# Patient Record
Sex: MALE | Race: WHITE | NOT HISPANIC OR LATINO | Employment: OTHER | URBAN - METROPOLITAN AREA
[De-identification: names, ages, dates, MRNs, and addresses within clinical notes are randomized per-mention and may not be internally consistent; named-entity substitution may affect disease eponyms.]

---

## 2023-09-27 ENCOUNTER — APPOINTMENT (OUTPATIENT)
Dept: MRI IMAGING | Facility: CLINIC | Age: 73
DRG: 073 | End: 2023-09-27
Attending: EMERGENCY MEDICINE
Payer: COMMERCIAL

## 2023-09-27 ENCOUNTER — APPOINTMENT (OUTPATIENT)
Dept: CT IMAGING | Facility: CLINIC | Age: 73
DRG: 073 | End: 2023-09-27
Attending: EMERGENCY MEDICINE
Payer: COMMERCIAL

## 2023-09-27 ENCOUNTER — HOSPITAL ENCOUNTER (INPATIENT)
Facility: CLINIC | Age: 73
LOS: 2 days | Discharge: HOME | DRG: 073 | End: 2023-09-29
Attending: EMERGENCY MEDICINE | Admitting: STUDENT IN AN ORGANIZED HEALTH CARE EDUCATION/TRAINING PROGRAM
Payer: COMMERCIAL

## 2023-09-27 DIAGNOSIS — R29.898 RIGHT ARM WEAKNESS: ICD-10-CM

## 2023-09-27 LAB
ALBUMIN UR-MCNC: NEGATIVE MG/DL
ANION GAP SERPL CALCULATED.3IONS-SCNC: 14 MMOL/L (ref 7–15)
APPEARANCE UR: CLEAR
APTT PPP: 29 SECONDS (ref 22–38)
ATRIAL RATE - MUSE: 74 BPM
BASOPHILS # BLD AUTO: 0.1 10E3/UL (ref 0–0.2)
BASOPHILS NFR BLD AUTO: 1 %
BILIRUB UR QL STRIP: NEGATIVE
BUN SERPL-MCNC: 5.6 MG/DL (ref 8–23)
CALCIUM SERPL-MCNC: 8.7 MG/DL (ref 8.8–10.2)
CHLORIDE SERPL-SCNC: 94 MMOL/L (ref 98–107)
COLOR UR AUTO: NORMAL
CREAT SERPL-MCNC: 0.7 MG/DL (ref 0.67–1.17)
DEPRECATED HCO3 PLAS-SCNC: 24 MMOL/L (ref 22–29)
DIASTOLIC BLOOD PRESSURE - MUSE: NORMAL MMHG
EGFRCR SERPLBLD CKD-EPI 2021: >90 ML/MIN/1.73M2
EOSINOPHIL # BLD AUTO: 0.2 10E3/UL (ref 0–0.7)
EOSINOPHIL NFR BLD AUTO: 3 %
ERYTHROCYTE [DISTWIDTH] IN BLOOD BY AUTOMATED COUNT: 11.7 % (ref 10–15)
ETHANOL SERPL-MCNC: 0.21 G/DL
GLUCOSE SERPL-MCNC: 80 MG/DL (ref 70–99)
GLUCOSE UR STRIP-MCNC: NEGATIVE MG/DL
HBA1C MFR BLD: 4.6 %
HCT VFR BLD AUTO: 36.5 % (ref 40–53)
HGB BLD-MCNC: 13 G/DL (ref 13.3–17.7)
HGB UR QL STRIP: NEGATIVE
IMM GRANULOCYTES # BLD: 0 10E3/UL
IMM GRANULOCYTES NFR BLD: 0 %
INR PPP: 0.87 (ref 0.85–1.15)
INTERPRETATION ECG - MUSE: NORMAL
KETONES UR STRIP-MCNC: NEGATIVE MG/DL
LEUKOCYTE ESTERASE UR QL STRIP: NEGATIVE
LYMPHOCYTES # BLD AUTO: 1.5 10E3/UL (ref 0.8–5.3)
LYMPHOCYTES NFR BLD AUTO: 27 %
MCH RBC QN AUTO: 36.1 PG (ref 26.5–33)
MCHC RBC AUTO-ENTMCNC: 35.6 G/DL (ref 31.5–36.5)
MCV RBC AUTO: 101 FL (ref 78–100)
MONOCYTES # BLD AUTO: 0.6 10E3/UL (ref 0–1.3)
MONOCYTES NFR BLD AUTO: 10 %
NEUTROPHILS # BLD AUTO: 3.4 10E3/UL (ref 1.6–8.3)
NEUTROPHILS NFR BLD AUTO: 59 %
NITRATE UR QL: NEGATIVE
NRBC # BLD AUTO: 0 10E3/UL
NRBC BLD AUTO-RTO: 0 /100
P AXIS - MUSE: 46 DEGREES
PH UR STRIP: 6.5 [PH] (ref 5–7)
PLATELET # BLD AUTO: 226 10E3/UL (ref 150–450)
POTASSIUM SERPL-SCNC: 4.4 MMOL/L (ref 3.4–5.3)
PR INTERVAL - MUSE: 184 MS
QRS DURATION - MUSE: 102 MS
QT - MUSE: 412 MS
QTC - MUSE: 457 MS
R AXIS - MUSE: -28 DEGREES
RBC # BLD AUTO: 3.6 10E6/UL (ref 4.4–5.9)
RBC URINE: <1 /HPF
SODIUM SERPL-SCNC: 132 MMOL/L (ref 135–145)
SP GR UR STRIP: 1.01 (ref 1–1.03)
SYSTOLIC BLOOD PRESSURE - MUSE: NORMAL MMHG
T AXIS - MUSE: 61 DEGREES
TROPONIN T SERPL HS-MCNC: 14 NG/L
UROBILINOGEN UR STRIP-MCNC: NORMAL MG/DL
VENTRICULAR RATE- MUSE: 74 BPM
WBC # BLD AUTO: 5.8 10E3/UL (ref 4–11)
WBC URINE: 1 /HPF

## 2023-09-27 PROCEDURE — 120N000001 HC R&B MED SURG/OB

## 2023-09-27 PROCEDURE — A9585 GADOBUTROL INJECTION: HCPCS | Performed by: EMERGENCY MEDICINE

## 2023-09-27 PROCEDURE — 250N000011 HC RX IP 250 OP 636

## 2023-09-27 PROCEDURE — 99223 1ST HOSP IP/OBS HIGH 75: CPT | Mod: AI | Performed by: STUDENT IN AN ORGANIZED HEALTH CARE EDUCATION/TRAINING PROGRAM

## 2023-09-27 PROCEDURE — 255N000002 HC RX 255 OP 636: Performed by: EMERGENCY MEDICINE

## 2023-09-27 PROCEDURE — 82077 ASSAY SPEC XCP UR&BREATH IA: CPT | Performed by: EMERGENCY MEDICINE

## 2023-09-27 PROCEDURE — 82310 ASSAY OF CALCIUM: CPT | Performed by: EMERGENCY MEDICINE

## 2023-09-27 PROCEDURE — 80048 BASIC METABOLIC PNL TOTAL CA: CPT | Performed by: EMERGENCY MEDICINE

## 2023-09-27 PROCEDURE — 0042T CT HEAD PERFUSION W CONTRAST: CPT

## 2023-09-27 PROCEDURE — 80061 LIPID PANEL: CPT | Performed by: STUDENT IN AN ORGANIZED HEALTH CARE EDUCATION/TRAINING PROGRAM

## 2023-09-27 PROCEDURE — 72156 MRI NECK SPINE W/O & W/DYE: CPT

## 2023-09-27 PROCEDURE — 93005 ELECTROCARDIOGRAM TRACING: CPT

## 2023-09-27 PROCEDURE — 250N000011 HC RX IP 250 OP 636: Performed by: EMERGENCY MEDICINE

## 2023-09-27 PROCEDURE — 70553 MRI BRAIN STEM W/O & W/DYE: CPT

## 2023-09-27 PROCEDURE — 84484 ASSAY OF TROPONIN QUANT: CPT | Performed by: EMERGENCY MEDICINE

## 2023-09-27 PROCEDURE — 81001 URINALYSIS AUTO W/SCOPE: CPT | Performed by: EMERGENCY MEDICINE

## 2023-09-27 PROCEDURE — 258N000003 HC RX IP 258 OP 636: Performed by: EMERGENCY MEDICINE

## 2023-09-27 PROCEDURE — 85025 COMPLETE CBC W/AUTO DIFF WBC: CPT | Performed by: EMERGENCY MEDICINE

## 2023-09-27 PROCEDURE — 99291 CRITICAL CARE FIRST HOUR: CPT | Mod: 25

## 2023-09-27 PROCEDURE — 36415 COLL VENOUS BLD VENIPUNCTURE: CPT | Performed by: EMERGENCY MEDICINE

## 2023-09-27 PROCEDURE — 83036 HEMOGLOBIN GLYCOSYLATED A1C: CPT | Performed by: STUDENT IN AN ORGANIZED HEALTH CARE EDUCATION/TRAINING PROGRAM

## 2023-09-27 PROCEDURE — 85610 PROTHROMBIN TIME: CPT | Performed by: EMERGENCY MEDICINE

## 2023-09-27 PROCEDURE — 82607 VITAMIN B-12: CPT | Performed by: STUDENT IN AN ORGANIZED HEALTH CARE EDUCATION/TRAINING PROGRAM

## 2023-09-27 PROCEDURE — 70496 CT ANGIOGRAPHY HEAD: CPT

## 2023-09-27 PROCEDURE — 85730 THROMBOPLASTIN TIME PARTIAL: CPT | Performed by: EMERGENCY MEDICINE

## 2023-09-27 PROCEDURE — 250N000009 HC RX 250: Performed by: EMERGENCY MEDICINE

## 2023-09-27 PROCEDURE — 70450 CT HEAD/BRAIN W/O DYE: CPT

## 2023-09-27 RX ORDER — DIPHENHYDRAMINE HYDROCHLORIDE 50 MG/ML
INJECTION INTRAMUSCULAR; INTRAVENOUS
Status: COMPLETED
Start: 2023-09-27 | End: 2023-09-27

## 2023-09-27 RX ORDER — NICOTINE 21 MG/24HR
1 PATCH, TRANSDERMAL 24 HOURS TRANSDERMAL DAILY
Status: ON HOLD | COMMUNITY
End: 2023-09-28

## 2023-09-27 RX ORDER — ALBUTEROL SULFATE 90 UG/1
2 AEROSOL, METERED RESPIRATORY (INHALATION) 4 TIMES DAILY PRN
COMMUNITY

## 2023-09-27 RX ORDER — SERTRALINE HYDROCHLORIDE 100 MG/1
150 TABLET, FILM COATED ORAL DAILY
COMMUNITY

## 2023-09-27 RX ORDER — IPRATROPIUM BROMIDE AND ALBUTEROL SULFATE 2.5; .5 MG/3ML; MG/3ML
3 SOLUTION RESPIRATORY (INHALATION) EVERY 4 HOURS PRN
Status: DISCONTINUED | OUTPATIENT
Start: 2023-09-27 | End: 2023-09-29 | Stop reason: HOSPADM

## 2023-09-27 RX ORDER — LISINOPRIL 10 MG/1
10 TABLET ORAL DAILY
COMMUNITY

## 2023-09-27 RX ORDER — GADOBUTROL 604.72 MG/ML
7 INJECTION INTRAVENOUS ONCE
Status: COMPLETED | OUTPATIENT
Start: 2023-09-27 | End: 2023-09-27

## 2023-09-27 RX ORDER — ASPIRIN 81 MG/1
81 TABLET ORAL DAILY
Status: DISCONTINUED | OUTPATIENT
Start: 2023-09-28 | End: 2023-09-29 | Stop reason: HOSPADM

## 2023-09-27 RX ORDER — IOPAMIDOL 755 MG/ML
125 INJECTION, SOLUTION INTRAVASCULAR ONCE
Status: COMPLETED | OUTPATIENT
Start: 2023-09-27 | End: 2023-09-27

## 2023-09-27 RX ORDER — AMLODIPINE BESYLATE 10 MG/1
10 TABLET ORAL DAILY
COMMUNITY

## 2023-09-27 RX ORDER — ASPIRIN 81 MG/1
81 TABLET, CHEWABLE ORAL DAILY
Status: ON HOLD | COMMUNITY
End: 2023-09-28

## 2023-09-27 RX ADMIN — DIPHENHYDRAMINE HYDROCHLORIDE 50 MG: 50 INJECTION, SOLUTION INTRAMUSCULAR; INTRAVENOUS at 18:03

## 2023-09-27 RX ADMIN — GADOBUTROL 7 ML: 604.72 INJECTION INTRAVENOUS at 22:51

## 2023-09-27 RX ADMIN — SODIUM CHLORIDE 100 ML: 9 INJECTION, SOLUTION INTRAVENOUS at 18:05

## 2023-09-27 RX ADMIN — IOPAMIDOL 125 ML: 755 INJECTION, SOLUTION INTRAVENOUS at 18:05

## 2023-09-27 RX ADMIN — SODIUM CHLORIDE 1000 ML: 9 INJECTION, SOLUTION INTRAVENOUS at 18:49

## 2023-09-27 ASSESSMENT — ACTIVITIES OF DAILY LIVING (ADL)
ADLS_ACUITY_SCORE: 35

## 2023-09-27 NOTE — ED NOTES
Bed: ST03  Expected date:   Expected time:   Means of arrival:   Comments:  Shannan 541 73 M stroke alert left arm weakness alert eta [id number]

## 2023-09-27 NOTE — CONSULTS
[facility]    Stroke Telephone Note    I was called by Radha Cobb on 09/27/23 regarding patient Duane M Franek. 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 He went to take a nap around 12 noon and woke up at 1:30 PM with worsening RUE weakness. No other focal deficits on exam.     BP (!) 144/79   Pulse 67   Temp 98  F (36.7  C)   Resp 16   Wt 72.9 kg (160 lb 11.5 oz)   SpO2 96%   BMI 24.80 kg/m       Stroke Code Data (for stroke code without tele)  Stroke code activated 09/27/23   1755   Stroke provider first response  09/27/23   1759            Last known normal 09/27/23   1200        Time of discovery   (or onset of symptoms) 09/27/23   1330   Head CT read by Stroke Neuro Dr/Provider 09/27/23   1820   Was stroke code de-escalated? Yes 09/27/23 1829          Imaging Findings  CT head: Right corona radiata hypodensity, age indeterminate  CTA head/neck: No occlusion, dissection or hemodynamically significant stenosis    Intravenous Thrombolysis  Not given due to:   - unclear or unfavorable risk-benefit profile for extended window thrombolysis beyond the conventional 4.5 hour time window    Endovascular Treatment  Not initiated due to absence of proximal vessel occlusion    Impression  Acute on chronic RUE weakness, unclear etiology  Age indeterminate right corona radiata hypodensity    Recommendations   Recommend MRI Brain and MRI Cervical spine wwo contrast  If acute stroke noted on MRI, please call us back with further recommendations. Will need gen neuro consult otherwise    My recommendations are based on the information provided over the phone by Duane M Franek's in-person providers. They are not intended to replace the clinical judgment of his in-person providers. I was not requested to personally see or examine the patient at this time.    Alphonse Leary MD    Assistant  "Professor   Department of Neurology       Securely message with the [employer] Web Console (learn more here)   To page me or covering stroke neurology team member, click here: [employer]  Choose \"On Call\" tab at top, then select \"NEUROLOGY/ALL SITES\" from middle drop-down box, press Enter, then look for \"stroke\" or \"telestroke\" for your site.        "

## 2023-09-27 NOTE — ED TRIAGE NOTES
Pt presents via EMS for eval of right arm weakness after nap today. Pt reports going to sleep at 1200, waking up at 1600. Wife noted him to be off at 1630. Pt reports need surgery for back issues and having two drinks today.      Triage Assessment       Row Name 09/27/23 2633       Triage Assessment (Adult)    Airway WDL WDL       Peripheral/Neurovascular WDL    Peripheral Neurovascular WDL WDL       Cognitive/Neuro/Behavioral WDL    Cognitive/Neuro/Behavioral WDL --  Right arm weakness

## 2023-09-27 NOTE — ED PROVIDER NOTES
History     Chief Complaint:  One-sided Weakness       The history is provided by the patient.      Duane M Franek is a 73 year old male history of hypertension presenting today with right upper extremity weakness.  Patient states that he took a nap at noon, woke up at 1300.  He noted that his right upper extremity was weak.  States that he had does have some chronic numbness to the arm due to cervical spine issues however this was much worse.  States that it progressively worsened and he noted that it was worse at 1630.  EMS was called.  They noted weakness to the right upper extremity.  No weakness to the right lower extremity.  No facial droop.      Independent Historian:   None - Patient Only    Review of External Notes:   No prior medical records listed    Medications:    The patient is not currently taking any prescribed medications.    Past Medical History:    No pertinent past medical history.     Past Surgical History:    Back surgery  Hip joint replacement     Physical Exam   Patient Vitals for the past 24 hrs:   BP Temp Temp src Pulse Resp SpO2 Weight   09/27/23 2056 -- -- -- 74 -- 97 % --   09/27/23 1956 -- -- -- 71 17 96 % --   09/27/23 1949 -- -- -- 74 11 96 % --   09/27/23 1849 -- -- -- 74 18 91 % --   09/27/23 1848 -- -- -- 74 14 92 % --   09/27/23 1847 -- -- -- 75 23 91 % --   09/27/23 1846 -- -- -- 75 13 97 % --   09/27/23 1835 131/87 -- -- -- -- -- --   09/27/23 1830 (!) 153/73 -- -- 74 14 93 % --   09/27/23 1825 (!) 141/78 -- -- 74 -- -- --   09/27/23 1809 (!) 142/62 -- -- 76 18 -- --   09/27/23 1805 -- -- -- -- -- -- 72.9 kg (160 lb 11.5 oz)   09/27/23 1753 -- -- Temporal -- 16 96 % --   09/27/23 1752 (!) 144/79 98  F (36.7  C) -- 67 -- -- --        Physical Exam  Vitals reviewed.   Constitutional:       General: He is not in acute distress.     Appearance: He is not ill-appearing.   HENT:      Head: Normocephalic and atraumatic.   Eyes:      Extraocular Movements: Extraocular movements  intact.   Cardiovascular:      Rate and Rhythm: Normal rate and regular rhythm.   Pulmonary:      Effort: Pulmonary effort is normal. No respiratory distress.      Breath sounds: Normal breath sounds. No wheezing.   Abdominal:      Palpations: Abdomen is soft.      Tenderness: There is no abdominal tenderness. There is no guarding.   Musculoskeletal:      Cervical back: Normal range of motion.   Skin:     General: Skin is warm and dry.   Neurological:      Mental Status: He is alert and oriented to person, place, and time.      GCS: GCS eye subscore is 4. GCS verbal subscore is 5. GCS motor subscore is 6.      Comments: MENTAL STATUS: A/O x 4, speech is fluent and spontaneous.  CRANIAL NERVES: CN II: No visual field deficits. Pupils are reactive to light. III, IV, VI: Extraocular muscles are intact. V: Facial sensation is equal bilaterally. VII: Eyebrow raise and smile are equal bilaterally. IX and X: Palate elevation is equal. XI: Shoulder shrug is equal. XII: Tongue protrusion without deviation.  SENSORY: Sensation is intact to light touch.   MOTOR: No Pronator drift. No atrophy.   deCreased  strength noted to the right hand.   Psychiatric:         Behavior: Behavior normal.       Emergency Department Course   ECG  ECG taken at 1828, ECG read at 1834  Normal sinus rhythm  Incomplete right bundle branch block  Borderline ECG  Rate 74 bpm. SD interval 184 ms. QRS duration 102 ms. QT/QTc 412/457 ms. P-R-T axes 46 -2/8 61.     Imaging:  CT Head Perfusion w Contrast - For Tier 2 Stroke   Final Result   IMPRESSION:    HEAD CT:   1.  Multiple hypodensities compatible with age-indeterminate lacunar infarct, including bilateral corona radiata extending to centrum semiovale on the left, left putamen, and right cerebellum. Correlate with exam and prior head imaging, if available,    and/or consider MRI to further assess.   2.  Deep gray nuclei are ill-defined, which may be related to technical factors and microvascular  ischemic changes. A component of recent ischemia contributing to this appearance is not excluded.   3.  No acute intracranial hemorrhage.   4.  Features compatible with sequela from chronic ischemic microvascular change and diffuse cerebral volume loss.      HEAD CTA:   1.  No proximal branch vessel occlusion, flow-limiting stenosis, aneurysm, or vascular malformation. Mild atherosclerosis about the carotid siphons and at the left V4 vertebral artery      NECK CTA:   1.  Atherosclerosis at the carotid bifurcations including approximately 50% right cervical ICA stenosis.   2.  Mild atherosclerosis at the vertebral artery origins without flow-limiting stenosis.    3.  Age indeterminate T5 compression fracture with moderate height loss. Underlying advanced spondylosis and laminectomy defects at C3-C5.      CT PERFUSION:   1.  No convincing perfusion defect allowing for patient motion artifact, as detailed.      Nacho Peterson MD notified provider, VERONICA GONSALEZ, of preliminary CT head and CTA results via telephone at 9/27/2023 6:19 PM CDT, who acknowledge understanding.      CTA Head Neck with Contrast   Final Result   IMPRESSION:    HEAD CT:   1.  Multiple hypodensities compatible with age-indeterminate lacunar infarct, including bilateral corona radiata extending to centrum semiovale on the left, left putamen, and right cerebellum. Correlate with exam and prior head imaging, if available,    and/or consider MRI to further assess.   2.  Deep gray nuclei are ill-defined, which may be related to technical factors and microvascular ischemic changes. A component of recent ischemia contributing to this appearance is not excluded.   3.  No acute intracranial hemorrhage.   4.  Features compatible with sequela from chronic ischemic microvascular change and diffuse cerebral volume loss.      HEAD CTA:   1.  No proximal branch vessel occlusion, flow-limiting stenosis, aneurysm, or vascular malformation. Mild atherosclerosis  about the carotid siphons and at the left V4 vertebral artery      NECK CTA:   1.  Atherosclerosis at the carotid bifurcations including approximately 50% right cervical ICA stenosis.   2.  Mild atherosclerosis at the vertebral artery origins without flow-limiting stenosis.    3.  Age indeterminate T5 compression fracture with moderate height loss. Underlying advanced spondylosis and laminectomy defects at C3-C5.      CT PERFUSION:   1.  No convincing perfusion defect allowing for patient motion artifact, as detailed.      Nacho Peterson MD notified provider, VERONICA GONSALEZ, of preliminary CT head and CTA results via telephone at 9/27/2023 6:19 PM CDT, who acknowledge understanding.      CT Head w/o Contrast   Final Result   IMPRESSION:    HEAD CT:   1.  Multiple hypodensities compatible with age-indeterminate lacunar infarct, including bilateral corona radiata extending to centrum semiovale on the left, left putamen, and right cerebellum. Correlate with exam and prior head imaging, if available,    and/or consider MRI to further assess.   2.  Deep gray nuclei are ill-defined, which may be related to technical factors and microvascular ischemic changes. A component of recent ischemia contributing to this appearance is not excluded.   3.  No acute intracranial hemorrhage.   4.  Features compatible with sequela from chronic ischemic microvascular change and diffuse cerebral volume loss.      HEAD CTA:   1.  No proximal branch vessel occlusion, flow-limiting stenosis, aneurysm, or vascular malformation. Mild atherosclerosis about the carotid siphons and at the left V4 vertebral artery      NECK CTA:   1.  Atherosclerosis at the carotid bifurcations including approximately 50% right cervical ICA stenosis.   2.  Mild atherosclerosis at the vertebral artery origins without flow-limiting stenosis.    3.  Age indeterminate T5 compression fracture with moderate height loss. Underlying advanced spondylosis and laminectomy  defects at C3-C5.      CT PERFUSION:   1.  No convincing perfusion defect allowing for patient motion artifact, as detailed.      Nacho Peterson MD notified provider, VERONICA GONSALEZ, of preliminary CT head and CTA results via telephone at 9/27/2023 6:19 PM CDT, who acknowledge understanding.      MR Brain w/o & w Contrast    (Results Pending)   MR Cervical Spine w/o & w Contrast    (Results Pending)      Report per radiology    Laboratory:  Labs Ordered and Resulted from Time of ED Arrival to Time of ED Departure   BASIC METABOLIC PANEL - Abnormal       Result Value    Sodium 132 (*)     Potassium 4.4      Chloride 94 (*)     Carbon Dioxide (CO2) 24      Anion Gap 14      Urea Nitrogen 5.6 (*)     Creatinine 0.70      GFR Estimate >90      Calcium 8.7 (*)     Glucose 80     ETHYL ALCOHOL LEVEL - Abnormal    Alcohol ethyl 0.21 (*)    CBC WITH PLATELETS AND DIFFERENTIAL - Abnormal    WBC Count 5.8      RBC Count 3.60 (*)     Hemoglobin 13.0 (*)     Hematocrit 36.5 (*)      (*)     MCH 36.1 (*)     MCHC 35.6      RDW 11.7      Platelet Count 226      % Neutrophils 59      % Lymphocytes 27      % Monocytes 10      % Eosinophils 3      % Basophils 1      % Immature Granulocytes 0      NRBCs per 100 WBC 0      Absolute Neutrophils 3.4      Absolute Lymphocytes 1.5      Absolute Monocytes 0.6      Absolute Eosinophils 0.2      Absolute Basophils 0.1      Absolute Immature Granulocytes 0.0      Absolute NRBCs 0.0     INR - Normal    INR 0.87     PARTIAL THROMBOPLASTIN TIME - Normal    aPTT 29     TROPONIN T, HIGH SENSITIVITY - Normal    Troponin T, High Sensitivity 14     ROUTINE UA WITH MICROSCOPIC REFLEX TO CULTURE - Normal    Color Urine Straw      Appearance Urine Clear      Glucose Urine Negative      Bilirubin Urine Negative      Ketones Urine Negative      Specific Gravity Urine 1.013      Blood Urine Negative      pH Urine 6.5      Protein Albumin Urine Negative      Urobilinogen Urine Normal      Nitrite  Urine Negative      Leukocyte Esterase Urine Negative      RBC Urine <1      WBC Urine 1     GLUCOSE MONITOR NURSING POCT   HEMOGLOBIN A1C   LIPID PROFILE      Emergency Department Course & Assessments:    Interventions:  Medications   iopamidol (ISOVUE-370) solution 125 mL (125 mLs Intravenous $Given 23 180)   Saline Flush (100 mLs Intravenous $Given 23 180)   diphenhydrAMINE (BENADRYL) 50 MG/ML injection (50 mg  $Given 23 1803)   sodium chloride 0.9% BOLUS 1,000 mL (1,000 mLs Intravenous $New Bag 23 184)        Assessments:   I obtained history and examined the patient as noted above.   I rechecked the patient and explained findings.    Independent Interpretation (X-rays, CTs, rhythm strip):  None    Consultations/Discussion of Management or Tests:  Case with neuro stroke, radiology, hospitalist.    ED Course as of 09/27/23 2216   Wed Sep 27, 2023   1805 Discussed case with neurology stroke.  They recommend MRI brain and cervical spine with and without contrast if CT of the head is normal.    Radiology:   Left putamen age indeterminate.   No LVO      1829 D/w with neuro stroke.  They reviewed imaging.  They would like MRI brain and cervical spine with and without contrast.    Wife at the bedside updated them on results and plan for MRI.    Pt re-evaluated sleeping on right side. Woke pt up. Re-evaluated arm, pt continues to have some weakness to arm. MRI pending    MRI, updated wife on plan of care.       Social Determinants of Health affecting care:   None    Disposition:  The patient was admitted to the hospital under the care of HEATHER Streeter .     Impression & Plan      Medical Decision Makin-year-old male presenting today with right arm weakness.  Symptoms started after a nap at 1330.  On presentation he is noted to have right-sided weakness to the upper extremities.  No lower extremity weakness.  No facial droop.  Tier 2 code stroke was called  patient was seen and evaluated by neurology.  Discussed with radiology regarding CT findings.  There was chronic microischemia, indeterminate lacunar infarct.  They recommended getting an MRI brain as well as cervical spine.     On reassessment in the ER patient continued right upper extremity weakness.  Patient currently at MRI.  Pending results.  I discussed case with hospitalist patient admitted to Dr. Ellison.      Diagnosis:    ICD-10-CM    1. Right arm weakness  R29.898            Discharge Medications:  New Prescriptions    No medications on file          Scribe Disclosure:  Marlena HERNÁNDEZ, am serving as a scribe at 6:45 PM on 9/27/2023 to document services personally performed by Radha Cobb DO, based on my observations and the provider's statements to me.   9/27/2023   Radha Cobb DO Doan, Tiffani, DO  09/27/23 2212

## 2023-09-28 ENCOUNTER — APPOINTMENT (OUTPATIENT)
Dept: CARDIOLOGY | Facility: CLINIC | Age: 73
DRG: 073 | End: 2023-09-28
Attending: STUDENT IN AN ORGANIZED HEALTH CARE EDUCATION/TRAINING PROGRAM
Payer: COMMERCIAL

## 2023-09-28 ENCOUNTER — APPOINTMENT (OUTPATIENT)
Dept: PHYSICAL THERAPY | Facility: CLINIC | Age: 73
DRG: 073 | End: 2023-09-28
Attending: STUDENT IN AN ORGANIZED HEALTH CARE EDUCATION/TRAINING PROGRAM
Payer: COMMERCIAL

## 2023-09-28 ENCOUNTER — APPOINTMENT (OUTPATIENT)
Dept: OCCUPATIONAL THERAPY | Facility: CLINIC | Age: 73
DRG: 073 | End: 2023-09-28
Attending: STUDENT IN AN ORGANIZED HEALTH CARE EDUCATION/TRAINING PROGRAM
Payer: COMMERCIAL

## 2023-09-28 LAB
CHOLEST SERPL-MCNC: 193 MG/DL
CRP SERPL-MCNC: <3 MG/L
FOLATE SERPL-MCNC: 17.2 NG/ML (ref 4.6–34.8)
HDLC SERPL-MCNC: 116 MG/DL
HOLD SPECIMEN: NORMAL
LDLC SERPL CALC-MCNC: 65 MG/DL
LVEF ECHO: NORMAL
NONHDLC SERPL-MCNC: 77 MG/DL
TRIGL SERPL-MCNC: 61 MG/DL
TSH SERPL DL<=0.005 MIU/L-ACNC: 0.65 UIU/ML (ref 0.3–4.2)
VIT B12 SERPL-MCNC: 359 PG/ML (ref 232–1245)

## 2023-09-28 PROCEDURE — 86140 C-REACTIVE PROTEIN: CPT | Performed by: PSYCHIATRY & NEUROLOGY

## 2023-09-28 PROCEDURE — 999N000208 ECHOCARDIOGRAM COMPLETE

## 2023-09-28 PROCEDURE — 36415 COLL VENOUS BLD VENIPUNCTURE: CPT | Performed by: STUDENT IN AN ORGANIZED HEALTH CARE EDUCATION/TRAINING PROGRAM

## 2023-09-28 PROCEDURE — 97112 NEUROMUSCULAR REEDUCATION: CPT | Mod: GO | Performed by: OCCUPATIONAL THERAPIST

## 2023-09-28 PROCEDURE — 258N000003 HC RX IP 258 OP 636: Performed by: STUDENT IN AN ORGANIZED HEALTH CARE EDUCATION/TRAINING PROGRAM

## 2023-09-28 PROCEDURE — 99406 BEHAV CHNG SMOKING 3-10 MIN: CPT

## 2023-09-28 PROCEDURE — 84443 ASSAY THYROID STIM HORMONE: CPT | Performed by: STUDENT IN AN ORGANIZED HEALTH CARE EDUCATION/TRAINING PROGRAM

## 2023-09-28 PROCEDURE — 97530 THERAPEUTIC ACTIVITIES: CPT | Mod: GO | Performed by: OCCUPATIONAL THERAPIST

## 2023-09-28 PROCEDURE — 250N000013 HC RX MED GY IP 250 OP 250 PS 637: Performed by: STUDENT IN AN ORGANIZED HEALTH CARE EDUCATION/TRAINING PROGRAM

## 2023-09-28 PROCEDURE — 97166 OT EVAL MOD COMPLEX 45 MIN: CPT | Mod: GO | Performed by: OCCUPATIONAL THERAPIST

## 2023-09-28 PROCEDURE — 93306 TTE W/DOPPLER COMPLETE: CPT | Mod: 26 | Performed by: INTERNAL MEDICINE

## 2023-09-28 PROCEDURE — 97116 GAIT TRAINING THERAPY: CPT | Mod: GP

## 2023-09-28 PROCEDURE — 97530 THERAPEUTIC ACTIVITIES: CPT | Mod: GP

## 2023-09-28 PROCEDURE — 120N000001 HC R&B MED SURG/OB

## 2023-09-28 PROCEDURE — 255N000002 HC RX 255 OP 636: Performed by: INTERNAL MEDICINE

## 2023-09-28 PROCEDURE — 97161 PT EVAL LOW COMPLEX 20 MIN: CPT | Mod: GP

## 2023-09-28 PROCEDURE — 82746 ASSAY OF FOLIC ACID SERUM: CPT | Performed by: STUDENT IN AN ORGANIZED HEALTH CARE EDUCATION/TRAINING PROGRAM

## 2023-09-28 PROCEDURE — 99233 SBSQ HOSP IP/OBS HIGH 50: CPT | Performed by: STUDENT IN AN ORGANIZED HEALTH CARE EDUCATION/TRAINING PROGRAM

## 2023-09-28 RX ORDER — PROCHLORPERAZINE 25 MG
12.5 SUPPOSITORY, RECTAL RECTAL EVERY 12 HOURS PRN
Status: DISCONTINUED | OUTPATIENT
Start: 2023-09-28 | End: 2023-09-29 | Stop reason: HOSPADM

## 2023-09-28 RX ORDER — PROCHLORPERAZINE MALEATE 5 MG
5 TABLET ORAL EVERY 6 HOURS PRN
Status: DISCONTINUED | OUTPATIENT
Start: 2023-09-28 | End: 2023-09-29 | Stop reason: HOSPADM

## 2023-09-28 RX ORDER — AMOXICILLIN 250 MG
2 CAPSULE ORAL 2 TIMES DAILY
Status: DISCONTINUED | OUTPATIENT
Start: 2023-09-28 | End: 2023-09-29 | Stop reason: HOSPADM

## 2023-09-28 RX ORDER — LIDOCAINE 40 MG/G
CREAM TOPICAL
Status: DISCONTINUED | OUTPATIENT
Start: 2023-09-28 | End: 2023-09-29 | Stop reason: HOSPADM

## 2023-09-28 RX ORDER — ONDANSETRON 2 MG/ML
4 INJECTION INTRAMUSCULAR; INTRAVENOUS EVERY 6 HOURS PRN
Status: DISCONTINUED | OUTPATIENT
Start: 2023-09-28 | End: 2023-09-29 | Stop reason: HOSPADM

## 2023-09-28 RX ORDER — IBUPROFEN 200 MG
400-600 TABLET ORAL 2 TIMES DAILY PRN
COMMUNITY

## 2023-09-28 RX ORDER — ACETAMINOPHEN 325 MG/10.15ML
650 LIQUID ORAL EVERY 4 HOURS PRN
Status: DISCONTINUED | OUTPATIENT
Start: 2023-09-28 | End: 2023-09-29 | Stop reason: HOSPADM

## 2023-09-28 RX ORDER — MULTIVITAMIN,THERAPEUTIC
1 TABLET ORAL DAILY
COMMUNITY

## 2023-09-28 RX ORDER — ACETAMINOPHEN 650 MG/1
650 SUPPOSITORY RECTAL EVERY 4 HOURS PRN
Status: DISCONTINUED | OUTPATIENT
Start: 2023-09-28 | End: 2023-09-29 | Stop reason: HOSPADM

## 2023-09-28 RX ORDER — ACETAMINOPHEN 325 MG/1
650 TABLET ORAL EVERY 4 HOURS PRN
Status: DISCONTINUED | OUTPATIENT
Start: 2023-09-28 | End: 2023-09-29 | Stop reason: HOSPADM

## 2023-09-28 RX ORDER — DIMENHYDRINATE 50 MG
1 TABLET ORAL DAILY
COMMUNITY

## 2023-09-28 RX ORDER — ONDANSETRON 4 MG/1
4 TABLET, ORALLY DISINTEGRATING ORAL EVERY 6 HOURS PRN
Status: DISCONTINUED | OUTPATIENT
Start: 2023-09-28 | End: 2023-09-29 | Stop reason: HOSPADM

## 2023-09-28 RX ORDER — AMOXICILLIN 250 MG
1 CAPSULE ORAL 2 TIMES DAILY
Status: DISCONTINUED | OUTPATIENT
Start: 2023-09-28 | End: 2023-09-29 | Stop reason: HOSPADM

## 2023-09-28 RX ADMIN — HUMAN ALBUMIN MICROSPHERES AND PERFLUTREN 9 ML: 10; .22 INJECTION, SOLUTION INTRAVENOUS at 08:14

## 2023-09-28 RX ADMIN — DEXTROSE AND SODIUM CHLORIDE: 5; 900 INJECTION, SOLUTION INTRAVENOUS at 00:56

## 2023-09-28 RX ADMIN — ASPIRIN 81 MG: 81 TABLET, COATED ORAL at 01:11

## 2023-09-28 RX ADMIN — SERTRALINE HYDROCHLORIDE 150 MG: 50 TABLET ORAL at 09:37

## 2023-09-28 RX ADMIN — ASPIRIN 81 MG: 81 TABLET, COATED ORAL at 07:38

## 2023-09-28 ASSESSMENT — ACTIVITIES OF DAILY LIVING (ADL)
DRESSING/BATHING_DIFFICULTY: NO
ADLS_ACUITY_SCORE: 20
DOING_ERRANDS_INDEPENDENTLY_DIFFICULTY: NO
CONCENTRATING,_REMEMBERING_OR_MAKING_DECISIONS_DIFFICULTY: NO
ADLS_ACUITY_SCORE: 20
TOILETING_ISSUES: NO
ADLS_ACUITY_SCORE: 20
ADLS_ACUITY_SCORE: 18
WALKING_OR_CLIMBING_STAIRS_DIFFICULTY: NO
ADLS_ACUITY_SCORE: 20
DIFFICULTY_EATING/SWALLOWING: NO
WEAR_GLASSES_OR_BLIND: NO
DEPENDENT_IADLS:: INDEPENDENT
ADLS_ACUITY_SCORE: 20
ADLS_ACUITY_SCORE: 20
FALL_HISTORY_WITHIN_LAST_SIX_MONTHS: YES
CHANGE_IN_FUNCTIONAL_STATUS_SINCE_ONSET_OF_CURRENT_ILLNESS/INJURY: NO
ADLS_ACUITY_SCORE: 20

## 2023-09-28 NOTE — ED NOTES
[facility]  ED Nurse Handoff Report    ED Chief complaint: One-sided Weakness      ED Diagnosis:   Final diagnoses:   Right arm weakness       Code Status: Full Code    Allergies:   Allergies   Allergen Reactions    Iodine Hives     Per patient at 12- OV, he can ingest seafood if he takes an antihistamine before meal    Varenicline Anxiety    Iodinated Contrast Media Hives       Patient Story: pt with chronic right arm numbness reports waking up with worsening right arm numbness after a nap at 1300. Tier 2 stroke called in ED - workup was negative. etoh noted be 0.21. symptoms improved while in ED - pt continues to lie on right arm.   Focused Assessment:  a&o x4, speech clear. + and equal strength to bilateral and lower extremities. +facial symmetry.    Treatments and/or interventions provided:   Results for orders placed or performed during the hospital encounter of 09/27/23   CT Head w/o Contrast     Status: None    Narrative    EXAM: CT HEAD W/O CONTRAST, CTA HEAD NECK W CONTRAST, CT HEAD PERFUSION W CONTRAST  LOCATION: [facility]  DATE/TIME: 9/27/2023 6:09 PM CDT    INDICATION: Code Stroke to evaluate for potential thrombolysis and thrombectomy. PLEASE READ IMMEDIATELY.  COMPARISON: None.  CONTRAST: 75 mL Isovue 370 (accession [id number]), 50mL Isovue 370 (accession [id number]), 75 mL Isovue 370 (accession [id number])  TECHNIQUE: Head and neck CT angiogram with IV contrast. Noncontrast head CT followed by axial helical CT images of the head and neck vessels obtained during the arterial phase of intravenous contrast administration. Axial 2D reconstructed images and   multiplanar 3D MIP reconstructed images of the head and neck vessels were performed by the technologist. Additional CT cerebral perfusion was performed utilizing a second contrast bolus. Perfusion data were post processed with generation of standard   perfusion maps and estimation of ischemic/infarcted  volumes utilizing standard threshold values. Dose reduction techniques were used. All stenosis measurements made according to NASCET criteria unless otherwise specified.    FINDINGS:    NONCONTRAST HEAD CT:   INTRACRANIAL CONTENTS: Hypodensities in the bilateral corona radiata, left posterior putamen, and right cerebellum compatible with age-indeterminate lacunar infarcts. Ill-defined hypoattenuation in the left centrum semiovale. Cortical gray-white matter   differentiation is relatively maintained. Somewhat ill-defined deep gray-white differentiation may be related to technical factors and/or sequela from chronic ischemic microvascular change. No hemorrhage or extraaxial collection. No mass effect.   Subarachnoid cisterns are patent. Patchy white matter hypodensities are, while nonspecific, most compatible with chronic microvascular ischemic changes. Mild generalized volume loss. No hydrocephalus.    VISUALIZED ORBITS/SINUSES/MASTOIDS: No visible intraorbital abnormality. Paranasal sinuses are clear. No middle ear or mastoid effusion.    BONES/SOFT TISSUES: No acute abnormality.    HEAD CTA:  ANTERIOR CIRCULATION: No proximal branch vessel occlusion. Atherosclerotic calcification scattered about the bilateral carotid siphons with mild bilateral paraclinoid ICA narrowing. No flow-limiting stenosis. No aneurysm or high-flow vascular   malformation.    POSTERIOR CIRCULATION: No proximal branch vessel occlusion. Mild focal atherosclerotic calcification at the V4 segment of the left vertebral artery. No aneurysm or high-flow vascular malformation. Hypoplastic caliber left vertebral artery with dominant   right vertebrobasilar supply.    DURAL VENOUS SINUSES: Not well evaluated on a technical basis.    NECK CTA:  RIGHT CAROTID: Calcified atherosclerotic plaque with approximately 50% cervical ICA stenosis by NASCET criteria at the carotid bifurcation. No dissection.    LEFT CAROTID: Mild atherosclerosis without  "hemodynamically significant stenosis by NASCET criteria at the carotid bifurcation. No dissection. Moderate stenosis at the left ECA origin.    VERTEBRAL ARTERIES: Right vertebral artery is dominant. Mild atherosclerosis at the bilateral origins and right proximal V2 segment without flow-limiting stenosis or findings of dissection. No flow-limiting stenosis or dissection.    AORTIC ARCH: Three-vessel aortic arch configuration. Mild atherosclerotic plaque about the visualized aortic arch and great vessel origins without flow-limiting stenosis.    NONVASCULAR STRUCTURES: Advanced emphysema in the visualized upper lungs with right apical pleural parenchymal scar. No neck mass or lymphadenopathy. Thyroid gland is homogenous. Advanced multilevel degenerative changes throughout the visualized cervical   and thoracic spine. Postoperative changes of previous laminectomy at C3-C5. Grade 1 anterolisthesis at C4-C5 and C7-T1. Age-indeterminate compression fracture deformity at T5 with moderate vertebral body height loss.    CT PERFUSION:  PERFUSION MAPS: Scattered small areas of prolonged Tmax within differing vascular territories is favored artifactual with patient motion noted on the QC graphs. No convincing true focal perfusion defect.    RAPID ANALYSIS:  CBF<30%: 0 mL  Tmax>6sec: 8 mL - suspected artifactual given distribution to differing vascular territories and lack of identifiable vascular lesion.  Mismatch volume: 8 mL  Mismatch ratio: \"Infinite\"      Impression    IMPRESSION:   HEAD CT:  1.  Multiple hypodensities compatible with age-indeterminate lacunar infarct, including bilateral corona radiata extending to centrum semiovale on the left, left putamen, and right cerebellum. Correlate with exam and prior head imaging, if available,   and/or consider MRI to further assess.  2.  Deep gray nuclei are ill-defined, which may be related to technical factors and microvascular ischemic changes. A component of recent " ischemia contributing to this appearance is not excluded.  3.  No acute intracranial hemorrhage.  4.  Features compatible with sequela from chronic ischemic microvascular change and diffuse cerebral volume loss.    HEAD CTA:  1.  No proximal branch vessel occlusion, flow-limiting stenosis, aneurysm, or vascular malformation. Mild atherosclerosis about the carotid siphons and at the left V4 vertebral artery    NECK CTA:  1.  Atherosclerosis at the carotid bifurcations including approximately 50% right cervical ICA stenosis.  2.  Mild atherosclerosis at the vertebral artery origins without flow-limiting stenosis.   3.  Age indeterminate T5 compression fracture with moderate height loss. Underlying advanced spondylosis and laminectomy defects at C3-C5.    CT PERFUSION:  1.  No convincing perfusion defect allowing for patient motion artifact, as detailed.    Nacho Peterson MD notified provider, VERONICA GONSALEZ, of preliminary CT head and CTA results via telephone at 9/27/2023 6:19 PM CDT, who acknowledge understanding.   CTA Head Neck with Contrast     Status: None    Narrative    EXAM: CT HEAD W/O CONTRAST, CTA HEAD NECK W CONTRAST, CT HEAD PERFUSION W CONTRAST  LOCATION: [facility]  DATE/TIME: 9/27/2023 6:09 PM CDT    INDICATION: Code Stroke to evaluate for potential thrombolysis and thrombectomy. PLEASE READ IMMEDIATELY.  COMPARISON: None.  CONTRAST: 75 mL Isovue 370 (accession [id number]), 50mL Isovue 370 (accession [id number]), 75 mL Isovue 370 (accession [id number])  TECHNIQUE: Head and neck CT angiogram with IV contrast. Noncontrast head CT followed by axial helical CT images of the head and neck vessels obtained during the arterial phase of intravenous contrast administration. Axial 2D reconstructed images and   multiplanar 3D MIP reconstructed images of the head and neck vessels were performed by the technologist. Additional CT cerebral perfusion was performed utilizing a second contrast  bolus. Perfusion data were post processed with generation of standard   perfusion maps and estimation of ischemic/infarcted volumes utilizing standard threshold values. Dose reduction techniques were used. All stenosis measurements made according to NASCET criteria unless otherwise specified.    FINDINGS:    NONCONTRAST HEAD CT:   INTRACRANIAL CONTENTS: Hypodensities in the bilateral corona radiata, left posterior putamen, and right cerebellum compatible with age-indeterminate lacunar infarcts. Ill-defined hypoattenuation in the left centrum semiovale. Cortical gray-white matter   differentiation is relatively maintained. Somewhat ill-defined deep gray-white differentiation may be related to technical factors and/or sequela from chronic ischemic microvascular change. No hemorrhage or extraaxial collection. No mass effect.   Subarachnoid cisterns are patent. Patchy white matter hypodensities are, while nonspecific, most compatible with chronic microvascular ischemic changes. Mild generalized volume loss. No hydrocephalus.    VISUALIZED ORBITS/SINUSES/MASTOIDS: No visible intraorbital abnormality. Paranasal sinuses are clear. No middle ear or mastoid effusion.    BONES/SOFT TISSUES: No acute abnormality.    HEAD CTA:  ANTERIOR CIRCULATION: No proximal branch vessel occlusion. Atherosclerotic calcification scattered about the bilateral carotid siphons with mild bilateral paraclinoid ICA narrowing. No flow-limiting stenosis. No aneurysm or high-flow vascular   malformation.    POSTERIOR CIRCULATION: No proximal branch vessel occlusion. Mild focal atherosclerotic calcification at the V4 segment of the left vertebral artery. No aneurysm or high-flow vascular malformation. Hypoplastic caliber left vertebral artery with dominant   right vertebrobasilar supply.    DURAL VENOUS SINUSES: Not well evaluated on a technical basis.    NECK CTA:  RIGHT CAROTID: Calcified atherosclerotic plaque with approximately 50% cervical ICA  "stenosis by NASCET criteria at the carotid bifurcation. No dissection.    LEFT CAROTID: Mild atherosclerosis without hemodynamically significant stenosis by NASCET criteria at the carotid bifurcation. No dissection. Moderate stenosis at the left ECA origin.    VERTEBRAL ARTERIES: Right vertebral artery is dominant. Mild atherosclerosis at the bilateral origins and right proximal V2 segment without flow-limiting stenosis or findings of dissection. No flow-limiting stenosis or dissection.    AORTIC ARCH: Three-vessel aortic arch configuration. Mild atherosclerotic plaque about the visualized aortic arch and great vessel origins without flow-limiting stenosis.    NONVASCULAR STRUCTURES: Advanced emphysema in the visualized upper lungs with right apical pleural parenchymal scar. No neck mass or lymphadenopathy. Thyroid gland is homogenous. Advanced multilevel degenerative changes throughout the visualized cervical   and thoracic spine. Postoperative changes of previous laminectomy at C3-C5. Grade 1 anterolisthesis at C4-C5 and C7-T1. Age-indeterminate compression fracture deformity at T5 with moderate vertebral body height loss.    CT PERFUSION:  PERFUSION MAPS: Scattered small areas of prolonged Tmax within differing vascular territories is favored artifactual with patient motion noted on the QC graphs. No convincing true focal perfusion defect.    RAPID ANALYSIS:  CBF<30%: 0 mL  Tmax>6sec: 8 mL - suspected artifactual given distribution to differing vascular territories and lack of identifiable vascular lesion.  Mismatch volume: 8 mL  Mismatch ratio: \"Infinite\"      Impression    IMPRESSION:   HEAD CT:  1.  Multiple hypodensities compatible with age-indeterminate lacunar infarct, including bilateral corona radiata extending to centrum semiovale on the left, left putamen, and right cerebellum. Correlate with exam and prior head imaging, if available,   and/or consider MRI to further assess.  2.  Deep gray nuclei are " ill-defined, which may be related to technical factors and microvascular ischemic changes. A component of recent ischemia contributing to this appearance is not excluded.  3.  No acute intracranial hemorrhage.  4.  Features compatible with sequela from chronic ischemic microvascular change and diffuse cerebral volume loss.    HEAD CTA:  1.  No proximal branch vessel occlusion, flow-limiting stenosis, aneurysm, or vascular malformation. Mild atherosclerosis about the carotid siphons and at the left V4 vertebral artery    NECK CTA:  1.  Atherosclerosis at the carotid bifurcations including approximately 50% right cervical ICA stenosis.  2.  Mild atherosclerosis at the vertebral artery origins without flow-limiting stenosis.   3.  Age indeterminate T5 compression fracture with moderate height loss. Underlying advanced spondylosis and laminectomy defects at C3-C5.    CT PERFUSION:  1.  No convincing perfusion defect allowing for patient motion artifact, as detailed.    Nacho Peterson MD notified provider, VERONICA GONSALEZ, of preliminary CT head and CTA results via telephone at 9/27/2023 6:19 PM CDT, who acknowledge understanding.   CT Head Perfusion w Contrast - For Tier 2 Stroke     Status: None    Narrative    EXAM: CT HEAD W/O CONTRAST, CTA HEAD NECK W CONTRAST, CT HEAD PERFUSION W CONTRAST  LOCATION: [facility]  DATE/TIME: 9/27/2023 6:09 PM CDT    INDICATION: Code Stroke to evaluate for potential thrombolysis and thrombectomy. PLEASE READ IMMEDIATELY.  COMPARISON: None.  CONTRAST: 75 mL Isovue 370 (accession [id number]), 50mL Isovue 370 (accession [id number]), 75 mL Isovue 370 (accession [id number])  TECHNIQUE: Head and neck CT angiogram with IV contrast. Noncontrast head CT followed by axial helical CT images of the head and neck vessels obtained during the arterial phase of intravenous contrast administration. Axial 2D reconstructed images and   multiplanar 3D MIP reconstructed images of the  head and neck vessels were performed by the technologist. Additional CT cerebral perfusion was performed utilizing a second contrast bolus. Perfusion data were post processed with generation of standard   perfusion maps and estimation of ischemic/infarcted volumes utilizing standard threshold values. Dose reduction techniques were used. All stenosis measurements made according to NASCET criteria unless otherwise specified.    FINDINGS:    NONCONTRAST HEAD CT:   INTRACRANIAL CONTENTS: Hypodensities in the bilateral corona radiata, left posterior putamen, and right cerebellum compatible with age-indeterminate lacunar infarcts. Ill-defined hypoattenuation in the left centrum semiovale. Cortical gray-white matter   differentiation is relatively maintained. Somewhat ill-defined deep gray-white differentiation may be related to technical factors and/or sequela from chronic ischemic microvascular change. No hemorrhage or extraaxial collection. No mass effect.   Subarachnoid cisterns are patent. Patchy white matter hypodensities are, while nonspecific, most compatible with chronic microvascular ischemic changes. Mild generalized volume loss. No hydrocephalus.    VISUALIZED ORBITS/SINUSES/MASTOIDS: No visible intraorbital abnormality. Paranasal sinuses are clear. No middle ear or mastoid effusion.    BONES/SOFT TISSUES: No acute abnormality.    HEAD CTA:  ANTERIOR CIRCULATION: No proximal branch vessel occlusion. Atherosclerotic calcification scattered about the bilateral carotid siphons with mild bilateral paraclinoid ICA narrowing. No flow-limiting stenosis. No aneurysm or high-flow vascular   malformation.    POSTERIOR CIRCULATION: No proximal branch vessel occlusion. Mild focal atherosclerotic calcification at the V4 segment of the left vertebral artery. No aneurysm or high-flow vascular malformation. Hypoplastic caliber left vertebral artery with dominant   right vertebrobasilar supply.    DURAL VENOUS SINUSES: Not well  "evaluated on a technical basis.    NECK CTA:  RIGHT CAROTID: Calcified atherosclerotic plaque with approximately 50% cervical ICA stenosis by NASCET criteria at the carotid bifurcation. No dissection.    LEFT CAROTID: Mild atherosclerosis without hemodynamically significant stenosis by NASCET criteria at the carotid bifurcation. No dissection. Moderate stenosis at the left ECA origin.    VERTEBRAL ARTERIES: Right vertebral artery is dominant. Mild atherosclerosis at the bilateral origins and right proximal V2 segment without flow-limiting stenosis or findings of dissection. No flow-limiting stenosis or dissection.    AORTIC ARCH: Three-vessel aortic arch configuration. Mild atherosclerotic plaque about the visualized aortic arch and great vessel origins without flow-limiting stenosis.    NONVASCULAR STRUCTURES: Advanced emphysema in the visualized upper lungs with right apical pleural parenchymal scar. No neck mass or lymphadenopathy. Thyroid gland is homogenous. Advanced multilevel degenerative changes throughout the visualized cervical   and thoracic spine. Postoperative changes of previous laminectomy at C3-C5. Grade 1 anterolisthesis at C4-C5 and C7-T1. Age-indeterminate compression fracture deformity at T5 with moderate vertebral body height loss.    CT PERFUSION:  PERFUSION MAPS: Scattered small areas of prolonged Tmax within differing vascular territories is favored artifactual with patient motion noted on the QC graphs. No convincing true focal perfusion defect.    RAPID ANALYSIS:  CBF<30%: 0 mL  Tmax>6sec: 8 mL - suspected artifactual given distribution to differing vascular territories and lack of identifiable vascular lesion.  Mismatch volume: 8 mL  Mismatch ratio: \"Infinite\"      Impression    IMPRESSION:   HEAD CT:  1.  Multiple hypodensities compatible with age-indeterminate lacunar infarct, including bilateral corona radiata extending to centrum semiovale on the left, left putamen, and right " cerebellum. Correlate with exam and prior head imaging, if available,   and/or consider MRI to further assess.  2.  Deep gray nuclei are ill-defined, which may be related to technical factors and microvascular ischemic changes. A component of recent ischemia contributing to this appearance is not excluded.  3.  No acute intracranial hemorrhage.  4.  Features compatible with sequela from chronic ischemic microvascular change and diffuse cerebral volume loss.    HEAD CTA:  1.  No proximal branch vessel occlusion, flow-limiting stenosis, aneurysm, or vascular malformation. Mild atherosclerosis about the carotid siphons and at the left V4 vertebral artery    NECK CTA:  1.  Atherosclerosis at the carotid bifurcations including approximately 50% right cervical ICA stenosis.  2.  Mild atherosclerosis at the vertebral artery origins without flow-limiting stenosis.   3.  Age indeterminate T5 compression fracture with moderate height loss. Underlying advanced spondylosis and laminectomy defects at C3-C5.    CT PERFUSION:  1.  No convincing perfusion defect allowing for patient motion artifact, as detailed.    Nacho Peterson MD notified provider, VERONICA GONSALEZ, of preliminary CT head and CTA results via telephone at 9/27/2023 6:19 PM CDT, who acknowledge understanding.   MR Brain w/o & w Contrast     Status: None    Narrative    EXAM: MR BRAIN W/O and W CONTRAST  LOCATION: [facility]  DATE: 9/27/2023    INDICATION:  Right upper extremity weakness  COMPARISON: None.  CONTRAST: 7 mL Gadavist  TECHNIQUE: Routine multiplanar multisequence head MRI without and with intravenous contrast.    FINDINGS:  INTRACRANIAL CONTENTS: No acute or subacute infarct. No mass, acute hemorrhage, or extra-axial fluid collections. Patchy nonspecific T2/FLAIR hyperintensities within the cerebral white matter most consistent with mild to moderate chronic microvascular   ischemic change. Chronic bilateral basal ganglia and  cerebellar infarcts with hemosiderin in the right lentiform nucleus. Mild generalized cerebral atrophy. No hydrocephalus. Normal position of the cerebellar tonsils. No pathologic contrast enhancement.    SELLA: No abnormality accounting for technique.    OSSEOUS STRUCTURES/SOFT TISSUES: Normal marrow signal. The major intracranial vascular flow voids are maintained.     ORBITS: No abnormality accounting for technique.     SINUSES/MASTOIDS: No paranasal sinus mucosal disease. No middle ear or mastoid effusion.       Impression    IMPRESSION:  1.  No acute intracranial process.  2.  Generalized brain atrophy and presumed microvascular ischemic changes as detailed above.   MR Cervical Spine w/o & w Contrast     Status: None    Narrative    EXAM: MR CERVICAL SPINE W/O and W CONTRAST  LOCATION: [facility]  DATE: 9/27/2023    INDICATION:  Right upper extremity weakness  COMPARISON:  Same day CTA neck.  CONTRAST: 7 mL Gadavist  TECHNIQUE: MRI Cervical Spine without and with IV contrast.    FINDINGS:   Vertebral body heights maintained. Minimal degenerative anterolisthesis of C4 on C5 and C7 on T1. C3-C5 laminectomies. Few levels of degenerative endplate edema and fatty marrow changes.  Otherwise, no suspicious marrow signal.  Focal cord signal   abnormality with cord thinning at C3-C4. No abnormal enhancement.      Postoperative changes of the dorsal paraspinal soft tissues. No prevertebral edema.    Craniovertebral junction and C1-C2: Widely patent.    C2-C3: Unremarkable appearance of the disc. Mild bilateral facet arthropathy. No significant canal or foraminal stenosis.     C3-C4: Broad-based disc and osteophyte. Severe right and mild left facet arthropathy with ligamentum flavum hypertrophy. There is narrowing of the thecal sac with cord flattening in the transverse dimension. Severe bilateral foraminal stenosis, right   greater than left.     C4-C5: Small broad-based disc and osteophyte.  Severe bilateral facet arthropathy. No significant canal stenosis. Moderate right and severe left foraminal stenosis.     C5-C6: Broad-based disc and osteophyte. Severe left and moderate right facet arthropathy. No significant canal stenosis. Mild bilateral foraminal stenosis.     C6-C7: Broad-based disc and osteophyte with ventral cord flattening. Mild to moderate bilateral facet arthropathy. Mild canal stenosis. Severe left and mild right foraminal stenosis.     C7-T1: Anterolisthesis with uncovering of the disc. Severe bilateral facet arthropathy. Mild to moderate canal stenosis with ventral cord flattening. Severe bilateral foraminal stenosis.      Impression    IMPRESSION:  1.  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  2.  Few levels of cord flattening without high-grade canal stenosis. Multilevel foraminal stenoses, as above.   Basic metabolic panel     Status: Abnormal   Result Value Ref Range    Sodium 132 (L) 135 - 145 mmol/L    Potassium 4.4 3.4 - 5.3 mmol/L    Chloride 94 (L) 98 - 107 mmol/L    Carbon Dioxide (CO2) 24 22 - 29 mmol/L    Anion Gap 14 7 - 15 mmol/L    Urea Nitrogen 5.6 (L) 8.0 - 23.0 mg/dL    Creatinine 0.70 0.67 - 1.17 mg/dL    GFR Estimate >90 >60 mL/min/1.73m2    Calcium 8.7 (L) 8.8 - 10.2 mg/dL    Glucose 80 70 - 99 mg/dL   INR     Status: Normal   Result Value Ref Range    INR 0.87 0.85 - 1.15   Partial thromboplastin time     Status: Normal   Result Value Ref Range    aPTT 29 22 - 38 Seconds   Troponin T, High Sensitivity     Status: Normal   Result Value Ref Range    Troponin T, High Sensitivity 14 <=22 ng/L   Alcohol level blood     Status: Abnormal   Result Value Ref Range    Alcohol ethyl 0.21 (H) <=0.01 g/dL   CBC with platelets and differential     Status: Abnormal   Result Value Ref Range    WBC Count 5.8 4.0 - 11.0 10e3/uL    RBC Count 3.60 (L) 4.40 - 5.90 10e6/uL    Hemoglobin 13.0 (L) 13.3 - 17.7 g/dL    Hematocrit 36.5 (L) 40.0 -  53.0 %     (H) 78 - 100 fL    MCH 36.1 (H) 26.5 - 33.0 pg    MCHC 35.6 31.5 - 36.5 g/dL    RDW 11.7 10.0 - 15.0 %    Platelet Count 226 150 - 450 10e3/uL    % Neutrophils 59 %    % Lymphocytes 27 %    % Monocytes 10 %    % Eosinophils 3 %    % Basophils 1 %    % Immature Granulocytes 0 %    NRBCs per 100 WBC 0 <1 /100    Absolute Neutrophils 3.4 1.6 - 8.3 10e3/uL    Absolute Lymphocytes 1.5 0.8 - 5.3 10e3/uL    Absolute Monocytes 0.6 0.0 - 1.3 10e3/uL    Absolute Eosinophils 0.2 0.0 - 0.7 10e3/uL    Absolute Basophils 0.1 0.0 - 0.2 10e3/uL    Absolute Immature Granulocytes 0.0 <=0.4 10e3/uL    Absolute NRBCs 0.0 10e3/uL   UA with Microscopic reflex to Culture     Status: Normal    Specimen: Urine, Clean Catch   Result Value Ref Range    Color Urine Straw Colorless, Straw, Light Yellow, Yellow    Appearance Urine Clear Clear    Glucose Urine Negative Negative mg/dL    Bilirubin Urine Negative Negative    Ketones Urine Negative Negative mg/dL    Specific Gravity Urine 1.013 1.003 - 1.035    Blood Urine Negative Negative    pH Urine 6.5 5.0 - 7.0    Protein Albumin Urine Negative Negative mg/dL    Urobilinogen Urine Normal Normal, 2.0 mg/dL    Nitrite Urine Negative Negative    Leukocyte Esterase Urine Negative Negative    RBC Urine <1 <=2 /HPF    WBC Urine 1 <=5 /HPF    Narrative    Urine Culture not indicated   Hemoglobin A1c     Status: Normal   Result Value Ref Range    Hemoglobin A1C 4.6 <5.7 %   EKG 12-lead, tracing only     Status: None   Result Value Ref Range    Systolic Blood Pressure  mmHg    Diastolic Blood Pressure  mmHg    Ventricular Rate 74 BPM    Atrial Rate 74 BPM    RI Interval 184 ms    QRS Duration 102 ms     ms    QTc 457 ms    P Axis 46 degrees    R AXIS -28 degrees    T Axis 61 degrees    Interpretation ECG       Sinus rhythm  Incomplete right bundle branch block  Borderline ECG  No previous ECGs available  Confirmed by GENERATED REPORT, COMPUTER ([id number]),  Kailash Blake  ([id number]) on 9/27/2023 6:35:00 PM     CBC with Platelets & Differential     Status: Abnormal    Narrative    The following orders were created for panel order CBC with Platelets & Differential.  Procedure                               Abnormality         Status                     ---------                               -----------         ------                     CBC with platelets and d...[[id number]]  Abnormal            Final result                 Please view results for these tests on the individual orders.       Patient's response to treatments and/or interventions: fair    To be done/followed up on inpatient unit:  n/a    Does this patient have any cognitive concerns?: none    Activity level - Baseline/Home:  Independent  Activity Level - Current:   Stand with Assist    Patient's Preferred language: English   Needed?: No    Isolation: None  Infection: Not Applicable  Patient tested for COVID 19 prior to admission: NO  Bariatric?: No    Vital Signs:   Vitals:    09/27/23 1849 09/27/23 1949 09/27/23 1956 09/27/23 2056   BP:       Pulse: 74 74 71 74   Resp: 18 11 17    Temp:       TempSrc:       SpO2: 91% 96% 96% 97%   Weight:           Cardiac Rhythm:     Was the PSS-3 completed:   Yes  What interventions are required if any?               Family Comments: wife at bedside  OBS brochure/video discussed/provided to patient/family: N/A              Name of person given brochure if not patient: n/a              Relationship to patient: /a    For the majority of the shift this patient's behavior was Green.   Behavioral interventions performed were reassurance and informaton.    ED NURSE PHONE NUMBER: [phone]

## 2023-09-28 NOTE — PHARMACY-ADMISSION MEDICATION HISTORY
"Pharmacist Admission Medication History    Admission medication history is complete. The information provided in this note is only as accurate as the sources available at the time of the update.    Medication reconciliation/reorder completed by provider prior to medication history? No    Information Source(s): Patient and CareEverywhere/SureScripts via phone    Pertinent Information: Pt states that \"[facility] orders these things that they think will help, but they don't. Aspirin was actually too strong for me.\"    Changes made to PTA medication list:  Added: MVI, flax seed oil, ibuprofen  Deleted: aspirin 81mg, diclofenac gel, tiotropium inhaler  Changed: sertraline dose from 100mg to 150mg    Medication Affordability:  Not including over the counter (OTC) medications, was there a time in the past 3 months when you did not take your medications as prescribed because of cost?: No    Allergies reviewed with patient and updates made in EHR: yes    Medication History Completed By: Jenny Dudley RPH 9/28/2023 9:19 AM    Prior to Admission medications    Medication Sig Last Dose Taking? Auth Provider Long Term End Date   amLODIPine (NORVASC) 10 MG tablet Take 10 mg by mouth daily 9/27/2023 at am Yes Unknown, Entered By History Yes    Flaxseed, Linseed, (FLAX SEED OIL) 1000 MG capsule Take 1 capsule by mouth daily 9/27/2023 at am Yes Unknown, Entered By History     ibuprofen (ADVIL/MOTRIN) 200 MG tablet Take 400-600 mg by mouth 2 times daily as needed for pain 9/27/2023 Yes Unknown, Entered By History     lisinopril (ZESTRIL) 10 MG tablet Take 10 mg by mouth daily 9/27/2023 at am Yes Unknown, Entered By History Yes    multivitamin, therapeutic (THERA-VIT) TABS tablet Take 1 tablet by mouth daily 9/27/2023 at am Yes Unknown, Entered By History     sertraline (ZOLOFT) 100 MG tablet Take 150 mg by mouth daily 9/27/2023 at am Yes Unknown, Entered By History Yes    vitamin B-12 (CYANOCOBALAMIN) 100 MCG tablet Take 100 mcg by mouth " daily 9/27/2023 at am Yes Unknown, Entered By History     albuterol (PROAIR HFA/PROVENTIL HFA/VENTOLIN HFA) 108 (90 Base) MCG/ACT inhaler Inhale 2 puffs into the lungs 4 times daily as needed for shortness of breath prn  Unknown, Entered By History Yes

## 2023-09-28 NOTE — PROGRESS NOTES
RECEIVING UNIT ED HANDOFF REVIEW    ED Nurse Handoff Report was reviewed by: Bonnie Mcdaniel RN on September 27, 2023 at 11:04 PM

## 2023-09-28 NOTE — PLAN OF CARE
Pt here with RUE weakness. A&Ox4. Neuros RUE weak, R hand numb. Tele SR. Reg diet, thin liquids. Takes pills whole. Up with A1 GBW. Denies pain. Pt scoring green on the Aggression Stop Light Tool. Plan Echo completed this AM, neurosurgery consult, OT dereck pending. Discharge plan- PT recommending home with Outpatient Physical Therapy.

## 2023-09-28 NOTE — PROGRESS NOTES
"[facility]    Medicine Progress Note - Hospitalist Service    Date of Admission:  9/27/2023    Assessment & Plan   73M hx of HTN, emphasema, depression and R ulnar neuropathy presenting with acute RUE weakness. Found to have age indeterminate lacunar infarcts.      Acute on chronic RUE weakness  Multiple cervical levels of foraminal stenosis, moderate to severe with moderate canal stenosis and cord flattening at C7-T1  T5 compression fracture  Patient presented with RUE weakness 4hrs prior to presentation upon waking up from a nap. Has hx of Ulnar neuropathy on the same side but stated that weakness was new. Ongoing outpatient neurosurgery work up for known cervical spine stenosis through \"[facility]\"  *CTA/perfusion Head: Mild atherosclerosis about the carotid siphons and at the left V4 vertebral artery. No convincing perfusion defect allowing for patient motion artifact   *CTA neck: Atherosclerosis at the carotid bifurcations including approximately 50% right cervical ICA stenosis. Mild atherosclerosis at the vertebral artery origins without flow-limiting stenosis  *Head CT: Multiple hypodensities compatible with age-indeterminate lacunar infarct, including bilateral corona radiata extending to centrum semiovale on the left, left putamen, and right cerebellum.   *MRI Brain/C Spine: No acute intracranial process. Generalized brain atrophy and presumed microvascular ischemic changes. 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 Few levels of cord flattening without high-grade canal stenosis. Multilevel foraminal stenoses, as above.     - B12 normal folate pending  - RUE weakness and clumsiness persistent today with decreased  strength. Obvious muscle atrophy noted to R hand.  - consult neurosurgery. Patient reports outpatient discussion with neurosurgery in outpatient however I am unable to confirm this on chart review. Start steroid burst?  - neurology " "also consulted on admission    Age indeterminate lacunar infarcts  Suspect weakness is due to his cervical stenosis however on work up incidental lacunar infarcts identified  - risk satisfy  - follow echo  - follow on tele     Essential HTN  CAD  Nicotine use disorder  EMR states patient had evidence of atherosclerosis on a Low dose Chest CT pending \"CV risk management\"  -150s on presentation, remaining vitals wnl    - Will not initiate Antihypertensives today and trend BP  - Nicotine dependancy referral     Pulmonary Emphysema  Solitary Nodule of Lung  Hx of pET [facility] that was [facility], [facility]  Hx of Emphesema based only on imaging and not symptoms  --Duonebs prn     DELONTE  doesn't use CPAP     Depression  on setraline (likely 100mg)  --continue sertraline     HypoNatremia  Mild Macrocytic Anemia  `Na 132, Hb 13,   --Folate/b12  -- repeat labs pending yet today     Spondylosis with Radiculopathy  Chronic Back pain  Right Ulnar Neuropathy   Patient states he's pending \"vertebral fusion' procedure  - care as stated above    Alcohol use  BAL positive on admission          Diet: Regular Diet Adult    DVT Prophylaxis: Pneumatic Compression Devices  Gotti Catheter: Not present  Lines: None     Cardiac Monitoring: None  Code Status: No CPR- Do NOT Intubate      Clinically Significant Risk Factors Present on Admission                # Drug Induced Platelet Defect: home medication list includes an antiplatelet medication   # Hypertension: Home medication list includes antihypertensive(s)                 Disposition Plan      Expected Discharge Date: 09/29/2023                  Yola Haney DO  Hospitalist Service  [facility]  Securely message with Direct Access Software (more info)  Text page via [employer] Paging/Directory   ______________________________________________________________________    Interval History   First visit with patient today. He is mildly confused when asked about " his arm weakness because he described it as coordination not weakness. Symptoms persistent and present on exam. He denies any other neuro changes but does tell me of numbness and tingling to his arm as well.     Physical Exam   Vital Signs: Temp: 98.7  F (37.1  C) Temp src: Oral BP: (!) 153/75 Pulse: 72   Resp: 16 SpO2: 96 % O2 Device: None (Room air)    Weight: 160 lbs 11.45 oz    Constitutional: Awake, alert, cooperative, no apparent distress  Respiratory: Clear to auscultation bilaterally, no crackles or wheezing  Cardiovascular: Regular rate and rhythm, normal S1 and S2, and no murmur noted  GI: Normal bowel sounds, soft, non-distended, non-tender  Skin/Integumen: No rashes, no cyanosis, no edema  Neuro: R arm with decreased  strength 4/5 with normal strength on left. able to move R shoulder but less movement in muscles of arm and hand    Medical Decision Making       55 MINUTES SPENT BY ME on the date of service doing chart review, history, exam, documentation & further activities per the note.  Extensive chart review to look up history. Reviewed several images and obtained history from patient    Data     I have personally reviewed the following data over the past 24 hrs:    5.8  \   13.0 (L)   / 226     132 (L) 94 (L) 5.6 (L) /  80   4.4 24 0.70 \     Trop: 14 BNP: N/A     TSH: N/A T4: N/A A1C: 4.6     INR:  0.87 PTT:  29   D-dimer:  N/A Fibrinogen:  N/A       Imaging results reviewed over the past 24 hrs:   Recent Results (from the past 24 hour(s))   CT Head w/o Contrast    Narrative    EXAM: CT HEAD W/O CONTRAST, CTA HEAD NECK W CONTRAST, CT HEAD PERFUSION W CONTRAST  LOCATION: [facility]  DATE/TIME: 9/27/2023 6:09 PM CDT    INDICATION: Code Stroke to evaluate for potential thrombolysis and thrombectomy. PLEASE READ IMMEDIATELY.  COMPARISON: None.  CONTRAST: 75 mL Isovue 370 (accession [id number]), 50mL Isovue 370 (accession [id number]), 75 mL Isovue 370 (accession  [id number])  TECHNIQUE: Head and neck CT angiogram with IV contrast. Noncontrast head CT followed by axial helical CT images of the head and neck vessels obtained during the arterial phase of intravenous contrast administration. Axial 2D reconstructed images and   multiplanar 3D MIP reconstructed images of the head and neck vessels were performed by the technologist. Additional CT cerebral perfusion was performed utilizing a second contrast bolus. Perfusion data were post processed with generation of standard   perfusion maps and estimation of ischemic/infarcted volumes utilizing standard threshold values. Dose reduction techniques were used. All stenosis measurements made according to NASCET criteria unless otherwise specified.    FINDINGS:    NONCONTRAST HEAD CT:   INTRACRANIAL CONTENTS: Hypodensities in the bilateral corona radiata, left posterior putamen, and right cerebellum compatible with age-indeterminate lacunar infarcts. Ill-defined hypoattenuation in the left centrum semiovale. Cortical gray-white matter   differentiation is relatively maintained. Somewhat ill-defined deep gray-white differentiation may be related to technical factors and/or sequela from chronic ischemic microvascular change. No hemorrhage or extraaxial collection. No mass effect.   Subarachnoid cisterns are patent. Patchy white matter hypodensities are, while nonspecific, most compatible with chronic microvascular ischemic changes. Mild generalized volume loss. No hydrocephalus.    VISUALIZED ORBITS/SINUSES/MASTOIDS: No visible intraorbital abnormality. Paranasal sinuses are clear. No middle ear or mastoid effusion.    BONES/SOFT TISSUES: No acute abnormality.    HEAD CTA:  ANTERIOR CIRCULATION: No proximal branch vessel occlusion. Atherosclerotic calcification scattered about the bilateral carotid siphons with mild bilateral paraclinoid ICA narrowing. No flow-limiting stenosis. No aneurysm or high-flow vascular    malformation.    POSTERIOR CIRCULATION: No proximal branch vessel occlusion. Mild focal atherosclerotic calcification at the V4 segment of the left vertebral artery. No aneurysm or high-flow vascular malformation. Hypoplastic caliber left vertebral artery with dominant   right vertebrobasilar supply.    DURAL VENOUS SINUSES: Not well evaluated on a technical basis.    NECK CTA:  RIGHT CAROTID: Calcified atherosclerotic plaque with approximately 50% cervical ICA stenosis by NASCET criteria at the carotid bifurcation. No dissection.    LEFT CAROTID: Mild atherosclerosis without hemodynamically significant stenosis by NASCET criteria at the carotid bifurcation. No dissection. Moderate stenosis at the left ECA origin.    VERTEBRAL ARTERIES: Right vertebral artery is dominant. Mild atherosclerosis at the bilateral origins and right proximal V2 segment without flow-limiting stenosis or findings of dissection. No flow-limiting stenosis or dissection.    AORTIC ARCH: Three-vessel aortic arch configuration. Mild atherosclerotic plaque about the visualized aortic arch and great vessel origins without flow-limiting stenosis.    NONVASCULAR STRUCTURES: Advanced emphysema in the visualized upper lungs with right apical pleural parenchymal scar. No neck mass or lymphadenopathy. Thyroid gland is homogenous. Advanced multilevel degenerative changes throughout the visualized cervical   and thoracic spine. Postoperative changes of previous laminectomy at C3-C5. Grade 1 anterolisthesis at C4-C5 and C7-T1. Age-indeterminate compression fracture deformity at T5 with moderate vertebral body height loss.    CT PERFUSION:  PERFUSION MAPS: Scattered small areas of prolonged Tmax within differing vascular territories is favored artifactual with patient motion noted on the QC graphs. No convincing true focal perfusion defect.    RAPID ANALYSIS:  CBF<30%: 0 mL  Tmax>6sec: 8 mL - suspected artifactual given distribution to differing vascular  "territories and lack of identifiable vascular lesion.  Mismatch volume: 8 mL  Mismatch ratio: \"Infinite\"      Impression    IMPRESSION:   HEAD CT:  1.  Multiple hypodensities compatible with age-indeterminate lacunar infarct, including bilateral corona radiata extending to centrum semiovale on the left, left putamen, and right cerebellum. Correlate with exam and prior head imaging, if available,   and/or consider MRI to further assess.  2.  Deep gray nuclei are ill-defined, which may be related to technical factors and microvascular ischemic changes. A component of recent ischemia contributing to this appearance is not excluded.  3.  No acute intracranial hemorrhage.  4.  Features compatible with sequela from chronic ischemic microvascular change and diffuse cerebral volume loss.    HEAD CTA:  1.  No proximal branch vessel occlusion, flow-limiting stenosis, aneurysm, or vascular malformation. Mild atherosclerosis about the carotid siphons and at the left V4 vertebral artery    NECK CTA:  1.  Atherosclerosis at the carotid bifurcations including approximately 50% right cervical ICA stenosis.  2.  Mild atherosclerosis at the vertebral artery origins without flow-limiting stenosis.   3.  Age indeterminate T5 compression fracture with moderate height loss. Underlying advanced spondylosis and laminectomy defects at C3-C5.    CT PERFUSION:  1.  No convincing perfusion defect allowing for patient motion artifact, as detailed.    Nacho Peterson MD notified provider, VERONICA GONSALEZ, of preliminary CT head and CTA results via telephone at 9/27/2023 6:19 PM CDT, who acknowledge understanding.   CTA Head Neck with Contrast    Narrative    EXAM: CT HEAD W/O CONTRAST, CTA HEAD NECK W CONTRAST, CT HEAD PERFUSION W CONTRAST  LOCATION: [facility]  DATE/TIME: 9/27/2023 6:09 PM CDT    INDICATION: Code Stroke to evaluate for potential thrombolysis and thrombectomy. PLEASE READ IMMEDIATELY.  COMPARISON: " None.  CONTRAST: 75 mL Isovue 370 (accession [id number]), 50mL Isovue 370 (accession [id number]), 75 mL Isovue 370 (accession [id number])  TECHNIQUE: Head and neck CT angiogram with IV contrast. Noncontrast head CT followed by axial helical CT images of the head and neck vessels obtained during the arterial phase of intravenous contrast administration. Axial 2D reconstructed images and   multiplanar 3D MIP reconstructed images of the head and neck vessels were performed by the technologist. Additional CT cerebral perfusion was performed utilizing a second contrast bolus. Perfusion data were post processed with generation of standard   perfusion maps and estimation of ischemic/infarcted volumes utilizing standard threshold values. Dose reduction techniques were used. All stenosis measurements made according to NASCET criteria unless otherwise specified.    FINDINGS:    NONCONTRAST HEAD CT:   INTRACRANIAL CONTENTS: Hypodensities in the bilateral corona radiata, left posterior putamen, and right cerebellum compatible with age-indeterminate lacunar infarcts. Ill-defined hypoattenuation in the left centrum semiovale. Cortical gray-white matter   differentiation is relatively maintained. Somewhat ill-defined deep gray-white differentiation may be related to technical factors and/or sequela from chronic ischemic microvascular change. No hemorrhage or extraaxial collection. No mass effect.   Subarachnoid cisterns are patent. Patchy white matter hypodensities are, while nonspecific, most compatible with chronic microvascular ischemic changes. Mild generalized volume loss. No hydrocephalus.    VISUALIZED ORBITS/SINUSES/MASTOIDS: No visible intraorbital abnormality. Paranasal sinuses are clear. No middle ear or mastoid effusion.    BONES/SOFT TISSUES: No acute abnormality.    HEAD CTA:  ANTERIOR CIRCULATION: No proximal branch vessel occlusion. Atherosclerotic calcification scattered about the bilateral carotid siphons with mild  bilateral paraclinoid ICA narrowing. No flow-limiting stenosis. No aneurysm or high-flow vascular   malformation.    POSTERIOR CIRCULATION: No proximal branch vessel occlusion. Mild focal atherosclerotic calcification at the V4 segment of the left vertebral artery. No aneurysm or high-flow vascular malformation. Hypoplastic caliber left vertebral artery with dominant   right vertebrobasilar supply.    DURAL VENOUS SINUSES: Not well evaluated on a technical basis.    NECK CTA:  RIGHT CAROTID: Calcified atherosclerotic plaque with approximately 50% cervical ICA stenosis by NASCET criteria at the carotid bifurcation. No dissection.    LEFT CAROTID: Mild atherosclerosis without hemodynamically significant stenosis by NASCET criteria at the carotid bifurcation. No dissection. Moderate stenosis at the left ECA origin.    VERTEBRAL ARTERIES: Right vertebral artery is dominant. Mild atherosclerosis at the bilateral origins and right proximal V2 segment without flow-limiting stenosis or findings of dissection. No flow-limiting stenosis or dissection.    AORTIC ARCH: Three-vessel aortic arch configuration. Mild atherosclerotic plaque about the visualized aortic arch and great vessel origins without flow-limiting stenosis.    NONVASCULAR STRUCTURES: Advanced emphysema in the visualized upper lungs with right apical pleural parenchymal scar. No neck mass or lymphadenopathy. Thyroid gland is homogenous. Advanced multilevel degenerative changes throughout the visualized cervical   and thoracic spine. Postoperative changes of previous laminectomy at C3-C5. Grade 1 anterolisthesis at C4-C5 and C7-T1. Age-indeterminate compression fracture deformity at T5 with moderate vertebral body height loss.    CT PERFUSION:  PERFUSION MAPS: Scattered small areas of prolonged Tmax within differing vascular territories is favored artifactual with patient motion noted on the QC graphs. No convincing true focal perfusion defect.    RAPID  "ANALYSIS:  CBF<30%: 0 mL  Tmax>6sec: 8 mL - suspected artifactual given distribution to differing vascular territories and lack of identifiable vascular lesion.  Mismatch volume: 8 mL  Mismatch ratio: \"Infinite\"      Impression    IMPRESSION:   HEAD CT:  1.  Multiple hypodensities compatible with age-indeterminate lacunar infarct, including bilateral corona radiata extending to centrum semiovale on the left, left putamen, and right cerebellum. Correlate with exam and prior head imaging, if available,   and/or consider MRI to further assess.  2.  Deep gray nuclei are ill-defined, which may be related to technical factors and microvascular ischemic changes. A component of recent ischemia contributing to this appearance is not excluded.  3.  No acute intracranial hemorrhage.  4.  Features compatible with sequela from chronic ischemic microvascular change and diffuse cerebral volume loss.    HEAD CTA:  1.  No proximal branch vessel occlusion, flow-limiting stenosis, aneurysm, or vascular malformation. Mild atherosclerosis about the carotid siphons and at the left V4 vertebral artery    NECK CTA:  1.  Atherosclerosis at the carotid bifurcations including approximately 50% right cervical ICA stenosis.  2.  Mild atherosclerosis at the vertebral artery origins without flow-limiting stenosis.   3.  Age indeterminate T5 compression fracture with moderate height loss. Underlying advanced spondylosis and laminectomy defects at C3-C5.    CT PERFUSION:  1.  No convincing perfusion defect allowing for patient motion artifact, as detailed.    Nacho Peterson MD notified provider, VERONICA GONSALEZ, of preliminary CT head and CTA results via telephone at 9/27/2023 6:19 PM CDT, who acknowledge understanding.   CT Head Perfusion w Contrast - For Tier 2 Stroke    Narrative    EXAM: CT HEAD W/O CONTRAST, CTA HEAD NECK W CONTRAST, CT HEAD PERFUSION W CONTRAST  LOCATION: [facility]  DATE/TIME: 9/27/2023 6:09 PM " CDT    INDICATION: Code Stroke to evaluate for potential thrombolysis and thrombectomy. PLEASE READ IMMEDIATELY.  COMPARISON: None.  CONTRAST: 75 mL Isovue 370 (accession [id number]), 50mL Isovue 370 (accession [id number]), 75 mL Isovue 370 (accession [id number])  TECHNIQUE: Head and neck CT angiogram with IV contrast. Noncontrast head CT followed by axial helical CT images of the head and neck vessels obtained during the arterial phase of intravenous contrast administration. Axial 2D reconstructed images and   multiplanar 3D MIP reconstructed images of the head and neck vessels were performed by the technologist. Additional CT cerebral perfusion was performed utilizing a second contrast bolus. Perfusion data were post processed with generation of standard   perfusion maps and estimation of ischemic/infarcted volumes utilizing standard threshold values. Dose reduction techniques were used. All stenosis measurements made according to NASCET criteria unless otherwise specified.    FINDINGS:    NONCONTRAST HEAD CT:   INTRACRANIAL CONTENTS: Hypodensities in the bilateral corona radiata, left posterior putamen, and right cerebellum compatible with age-indeterminate lacunar infarcts. Ill-defined hypoattenuation in the left centrum semiovale. Cortical gray-white matter   differentiation is relatively maintained. Somewhat ill-defined deep gray-white differentiation may be related to technical factors and/or sequela from chronic ischemic microvascular change. No hemorrhage or extraaxial collection. No mass effect.   Subarachnoid cisterns are patent. Patchy white matter hypodensities are, while nonspecific, most compatible with chronic microvascular ischemic changes. Mild generalized volume loss. No hydrocephalus.    VISUALIZED ORBITS/SINUSES/MASTOIDS: No visible intraorbital abnormality. Paranasal sinuses are clear. No middle ear or mastoid effusion.    BONES/SOFT TISSUES: No acute abnormality.    HEAD CTA:  ANTERIOR  CIRCULATION: No proximal branch vessel occlusion. Atherosclerotic calcification scattered about the bilateral carotid siphons with mild bilateral paraclinoid ICA narrowing. No flow-limiting stenosis. No aneurysm or high-flow vascular   malformation.    POSTERIOR CIRCULATION: No proximal branch vessel occlusion. Mild focal atherosclerotic calcification at the V4 segment of the left vertebral artery. No aneurysm or high-flow vascular malformation. Hypoplastic caliber left vertebral artery with dominant   right vertebrobasilar supply.    DURAL VENOUS SINUSES: Not well evaluated on a technical basis.    NECK CTA:  RIGHT CAROTID: Calcified atherosclerotic plaque with approximately 50% cervical ICA stenosis by NASCET criteria at the carotid bifurcation. No dissection.    LEFT CAROTID: Mild atherosclerosis without hemodynamically significant stenosis by NASCET criteria at the carotid bifurcation. No dissection. Moderate stenosis at the left ECA origin.    VERTEBRAL ARTERIES: Right vertebral artery is dominant. Mild atherosclerosis at the bilateral origins and right proximal V2 segment without flow-limiting stenosis or findings of dissection. No flow-limiting stenosis or dissection.    AORTIC ARCH: Three-vessel aortic arch configuration. Mild atherosclerotic plaque about the visualized aortic arch and great vessel origins without flow-limiting stenosis.    NONVASCULAR STRUCTURES: Advanced emphysema in the visualized upper lungs with right apical pleural parenchymal scar. No neck mass or lymphadenopathy. Thyroid gland is homogenous. Advanced multilevel degenerative changes throughout the visualized cervical   and thoracic spine. Postoperative changes of previous laminectomy at C3-C5. Grade 1 anterolisthesis at C4-C5 and C7-T1. Age-indeterminate compression fracture deformity at T5 with moderate vertebral body height loss.    CT PERFUSION:  PERFUSION MAPS: Scattered small areas of prolonged Tmax within differing vascular  "territories is favored artifactual with patient motion noted on the QC graphs. No convincing true focal perfusion defect.    RAPID ANALYSIS:  CBF<30%: 0 mL  Tmax>6sec: 8 mL - suspected artifactual given distribution to differing vascular territories and lack of identifiable vascular lesion.  Mismatch volume: 8 mL  Mismatch ratio: \"Infinite\"      Impression    IMPRESSION:   HEAD CT:  1.  Multiple hypodensities compatible with age-indeterminate lacunar infarct, including bilateral corona radiata extending to centrum semiovale on the left, left putamen, and right cerebellum. Correlate with exam and prior head imaging, if available,   and/or consider MRI to further assess.  2.  Deep gray nuclei are ill-defined, which may be related to technical factors and microvascular ischemic changes. A component of recent ischemia contributing to this appearance is not excluded.  3.  No acute intracranial hemorrhage.  4.  Features compatible with sequela from chronic ischemic microvascular change and diffuse cerebral volume loss.    HEAD CTA:  1.  No proximal branch vessel occlusion, flow-limiting stenosis, aneurysm, or vascular malformation. Mild atherosclerosis about the carotid siphons and at the left V4 vertebral artery    NECK CTA:  1.  Atherosclerosis at the carotid bifurcations including approximately 50% right cervical ICA stenosis.  2.  Mild atherosclerosis at the vertebral artery origins without flow-limiting stenosis.   3.  Age indeterminate T5 compression fracture with moderate height loss. Underlying advanced spondylosis and laminectomy defects at C3-C5.    CT PERFUSION:  1.  No convincing perfusion defect allowing for patient motion artifact, as detailed.    Nacho Peterson MD notified provider, VERONICA GONSALEZ, of preliminary CT head and CTA results via telephone at 9/27/2023 6:19 PM CDT, who acknowledge understanding.   MR Brain w/o & w Contrast    Narrative    EXAM: MR BRAIN W/O and W CONTRAST  LOCATION: [facility] " [facility]  DATE: 9/27/2023    INDICATION:  Right upper extremity weakness  COMPARISON: None.  CONTRAST: 7 mL Gadavist  TECHNIQUE: Routine multiplanar multisequence head MRI without and with intravenous contrast.    FINDINGS:  INTRACRANIAL CONTENTS: No acute or subacute infarct. No mass, acute hemorrhage, or extra-axial fluid collections. Patchy nonspecific T2/FLAIR hyperintensities within the cerebral white matter most consistent with mild to moderate chronic microvascular   ischemic change. 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 Mild generalized cerebral atrophy. No hydrocephalus. Normal position of the cerebellar tonsils. No pathologic contrast enhancement.    SELLA: No abnormality accounting for technique.    OSSEOUS STRUCTURES/SOFT TISSUES: Normal marrow signal. The major intracranial vascular flow voids are maintained.     ORBITS: No abnormality accounting for technique.     SINUSES/MASTOIDS: No paranasal sinus mucosal disease. No middle ear or mastoid effusion.       Impression    IMPRESSION:  1.  No acute intracranial process.  2.  Generalized brain atrophy and presumed microvascular ischemic changes as detailed above.   MR Cervical Spine w/o & w Contrast    Narrative    EXAM: MR CERVICAL SPINE W/O and W CONTRAST  LOCATION: [facility]  DATE: 9/27/2023    INDICATION:  Right upper extremity weakness  COMPARISON:  Same day CTA neck.  CONTRAST: 7 mL Gadavist  TECHNIQUE: MRI Cervical Spine without and with IV contrast.    FINDINGS:   Vertebral body heights maintained. Minimal degenerative anterolisthesis of C4 on C5 and C7 on T1. C3-C5 laminectomies. Few levels of degenerative endplate edema and fatty marrow changes.  Otherwise, no suspicious marrow signal.  Focal cord signal   abnormality with cord thinning at C3-C4. No abnormal enhancement.      Postoperative changes of the dorsal paraspinal soft  tissues. No prevertebral edema.    Craniovertebral junction and C1-C2: Widely patent.    C2-C3: Unremarkable appearance of the disc. Mild bilateral facet arthropathy. No significant canal or foraminal stenosis.     C3-C4: Broad-based disc and osteophyte. Severe right and mild left facet arthropathy with ligamentum flavum hypertrophy. There is narrowing of the thecal sac with cord flattening in the transverse dimension. Severe bilateral foraminal stenosis, right   greater than left.     C4-C5: Small broad-based disc and osteophyte. Severe bilateral facet arthropathy. No significant canal stenosis. Moderate right and severe left foraminal stenosis.     C5-C6: Broad-based disc and osteophyte. Severe left and moderate right facet arthropathy. No significant canal stenosis. Mild bilateral foraminal stenosis.     C6-C7: Broad-based disc and osteophyte with ventral cord flattening. Mild to moderate bilateral facet arthropathy. Mild canal stenosis. Severe left and mild right foraminal stenosis.     C7-T1: Anterolisthesis with uncovering of the disc. Severe bilateral facet arthropathy. Mild to moderate canal stenosis with ventral cord flattening. Severe bilateral foraminal stenosis.      Impression    IMPRESSION:  1.  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  2.  Few levels of cord flattening without high-grade canal stenosis. Multilevel foraminal stenoses, as above.

## 2023-09-28 NOTE — PROVIDER NOTIFICATION
Paged cross coverage    [phone] Duane Franek - pt is NPO, should we hold PTA aspirin tonight until swallow eval? Please advise. Thanks! JOSH Storm [phone]

## 2023-09-28 NOTE — CONSULTS
"BRIEF NUTRITION ASSESSMENT      REASON FOR ASSESSMENT:  Duane M Franek is a 73 year old male seen by Registered Dietitian for Admission Nutrition Risk Screen:  Have you recently lost weight without trying? \">34#\"  Have you been eating poorly because of a decreased appetite? \"YES\"    CURRENT DIET AND INTAKE:  Diet:  Regular              Chart reviewed  Visited with pt this morning - was cut short by phone call  Pt tells me that in November 2022 he hurt his back  Since then, he has had decreased mobility and decreased appetite  States that his dtr has been pushing him to eat and drink  Finds that he pushes himself to consume meals even though he may not be hungry  States that he has protein powder and nutrition drinks at home - \"just haven't gotten into the program of using them regularly\"    ANTHROPOMETRICS:  Height: 5'7.5\"  Weight: (9/27) 72.9 kg / 160 lbs 11.45 oz  Body mass index is 24.8 kg/m .   Weight Status: Normal BMI  IBW:  68.6 kg  %IBW: 106%  Weight History:  Pt states that he weighed 200# last November, but records do not reflect this. Appears his wt did drop from November 2022 to July 2023 (~16#) - has since gone up 10# in the past 2 months.  Pt has noticed a decrease in is leg muscles.  Wt Readings from Last 10 Encounters:   09/27/23 72.9 kg (160 lb 11.5 oz)   12/18/15 88.9 kg (196 lb)     7/27/23 150.3 lb  11/17/22 166.9 lb    LABS:  Labs noted    MALNUTRITION:  Patient does not meet two of the following criteria necessary for diagnosing malnutrition.     % Weight Loss:  Weight loss does not meet criteria for malnutrition  - wt has fluctuated over the past 10 months  % Intake:  <75% for >/= 3 months (moderate malnutrition)  Subcutaneous Fat Loss:  None observed  Muscle Loss:  Anterior thigh region mild depletion  Fluid Retention:  None noted    NUTRITION INTERVENTION:  Nutrition Diagnosis:  No nutrition diagnosis at this time.    Implementation:  Nutrition Education: ---> Discussed the importance of " good po intake for energy and muscle preservation.  Encouraged pt to take at least 1 protein supplement/day once home (using his protein powder or drinking a commercial supplement)    FOLLOW UP/MONITORING:   Will re-evaluate in 7 - 10 days, or sooner, if re-consulted.      Nancy Tavera RD, LD  Clinical Dietitian - [facility]   Pager - [phone]

## 2023-09-28 NOTE — PLAN OF CARE
Reason for Admission: RUE weakness    Cognitive/Mentation: A/Ox 4, forgetful  Neuros/CMS: Intact ex RUE 4/5 with weak hand  + numbness  VS: stable on RA.   Tele: SR.  GI: Last BM 9/27/23. Continent.  : urinary frequency per pt d/t enlarged prostate. Continent.  Pulmonary: LS clear.  Pain: denies.     Drains/Lines: L PIV infusing d5 in NS @50ml/hr  Skin: scattered scrapes on lower extremities  Activity: Assist x 1 with GBW.  Diet: regular with thin liquids. Takes pills whole with water.     Therapies recs: pending  Discharge: pending    Aggression Stoplight Tool: green    End of shift summary: pt will need echo, MRI negative, awaiting gen neuro consult

## 2023-09-28 NOTE — PROGRESS NOTES
"   09/28/23 1600   General Information   Patient is receptive to smoking cessation at this time Yes   Packs Per Day 0.5 PPD   Years Smoked (#) 59 yrs   Cigarette Pack Years 29.5   Stage of Behavior Change   Patient's Stage of Behavior Change Contemplation \"I might (within the next 60 days\"   Processes of Change   The following interventions were used (smoking cessation) Develop action plan   Motivation to Quit Scale (1-10) 5   Confidence to Quit Scale (1-10) 5   Quit Attempt Date 09/12/23   Education/Recommendations   Education QUIT PLAN phone line   Treatment Time (Minutes) 5 minutes   Total Evaluation Time   Total Evaluation Time (Minutes) 5       "

## 2023-09-28 NOTE — PROGRESS NOTES
"   09/28/23 0843   Appointment Info   Signing Clinician's Name / Credentials (PT) Awa Venegas, PT, DPT   Living Environment   People in Home spouse;parent(s)  (90 year old Mother-in-law)   Current Living Arrangements house   Home Accessibility stairs within home   Number of Stairs, Within Home, Primary greater than 10 stairs   Stair Railings, Within Home, Primary railing on right side (ascending)   Transportation Anticipated family or friend will provide   Living Environment Comments No steps to enter the house. Stair lift to each level of home. Patient's bedroom is on second level. Has FWW, 4WW and cane at baseline.   Self-Care   Usual Activity Tolerance good   Current Activity Tolerance good   Regular Exercise No   Equipment Currently Used at Home none   Fall history within last six months yes   Number of times patient has fallen within last six months   (unable to recall how many, \"lots\")   Activity/Exercise/Self-Care Comment Independent with all mobility and ADLs at baseline. Reports \"a lot\" of falls including one where he hit his head/lost consciousness, but he did not get assessed after that. Works full time in sales, hoping to retire soon.   General Information   Onset of Illness/Injury or Date of Surgery 09/27/23   Referring Physician Ligia Ellison MD   Patient/Family Therapy Goals Statement (PT) to go home   Pertinent History of Current Problem (include personal factors and/or comorbidities that impact the POC) Patient is 74 YO M who presented to hospital on 9/27/23 with acute R UE weakness. Brain MRI negative for acute infarcts. C-spine MRI with Multilevel spondylosis status post C3-C5 laminectomies with myelomalacia at C3-C4 and Few levels of cord flattening without high-grade canal stenosis     He has PMH including hypertension, CAD, nicotine use, emphysema, depression and R ulnar neuropathy.   Existing Precautions/Restrictions fall   General Observations Patient sitting up in bed eating " breakfast. Agreeable to PT. Reports his R UE weakness has been gradual over the past year but acutely worsened yesterday .   Cognition   Affect/Mental Status (Cognition) WNL   Orientation Status (Cognition) oriented x 4   Follows Commands (Cognition) WNL   Safety Deficit (Cognition) minimal deficit;insight into deficits/self-awareness   Pain Assessment   Patient Currently in Pain No   Integumentary/Edema   Integumentary/Edema no deficits were identifed   Posture    Posture Forward head position;Protracted shoulders   Range of Motion (ROM)   ROM Comment Minimal finger extension on R hand; Full AROM R elbow and shoulder   Strength (Manual Muscle Testing)   Strength Comments R hand weakness; chronic B rotator cuff injuries; B LE Strength WFL and symmetrical   Bed Mobility   Bed Mobility no deficits identified   Comment, (Bed Mobility) Supine <> sit independently   Transfers   Transfers sit-stand transfer   Sit-Stand Transfer   Sit-Stand [place] (Transfers) supervision   Comment, (Sit-Stand Transfer) with no AD, mild trunk sway upon standing   Gait/Stairs (Locomotion)   [place] Level (Gait) contact guard   Distance in Feet 15' during eval   Pattern (Gait) step-through   Comment, (Gait/Stairs) Patient ambulated across hospital room with inconsistent step length, mild antalgic pattern on R LE (reports severe knee OA) and unsteadiness without UE support.   Balance   Balance Comments SBA for static standing, CGA with eyes closed, CGA ambulating and turning   Sensory Examination   Sensory Perception patient reports no sensory changes   Sensory Perception Comments Chronic R 4th and 5th finger numbness/tingling unchanged from his baseline   Coordination   Coordination Comments Difficulty grasping with R hand noted when finishing breakfast - self-selecting to eat with L hand despite baseline R handedness.   Clinical Impression   Criteria for Skilled Therapeutic Intervention Yes, treatment indicated   PT Diagnosis (PT)  Impaired mobility   Influenced by the following impairments Impaired balance, weakness   Functional limitations due to impairments Difficulty with ambulation and stairs   Clinical Presentation (PT Evaluation Complexity) Stable/Uncomplicated   Clinical Presentation Rationale Medical status, clinical judgement   Clinical Decision Making (Complexity) low complexity   Planned Therapy Interventions (PT) balance training;gait training;stair training;strengthening;transfer training;patient/family education   Anticipated Equipment Needs at Discharge (PT) walker, rolling  (has 4WW and FWW at home)   Risk & Benefits of therapy have been explained evaluation/treatment results reviewed;care plan/treatment goals reviewed;risks/benefits reviewed;current/potential barriers reviewed;participants voiced agreement with care plan;participants included;patient   PT Total Evaluation Time   PT Eval, Low Complexity Minutes (86476) 22   Physical Therapy Goals   PT Frequency Daily   PT Predicted Duration/Target Date for Goal Attainment 10/01/23   PT Goals Gait;Stairs   PT: Gait Modified independent;Rolling walker;Greater than 200 feet   PT: Stairs Greater than 10 stairs;Supervision/stand-by assist  (Rail on R ascending/L descending)   Interventions   Interventions Quick Adds Gait Training;Therapeutic Activity   Therapeutic Activity   Therapeutic Activities: dynamic activities to improve functional performance Minutes (29703) 13   Symptoms Noted During/After Treatment None   Treatment Detail/Skilled Intervention Patient in supine, agreeable to PT treatment. Independent supine to sit. Sit to stand with no AD and SBA, mild unsteadiness. Stand to sit into chair in rehab gym with cues for reaching back with hands, SBA. Skilled instruction on 4WW safety during transfers including locking brakes. Cued patient to use  L hand to assist R hand when locking R brake. Sit to stand with good return demo and SBA. In room, instruction on walker management  around furniture and when approaching bed/chair. Repeat cues needed for walker safety and locking brakes. Stand to sit onto EOB with SBA. Repeated sit <> stand from EOB with safe hand placements and SBA. Patient independently returned to supine. Patient in bed with alarm activated at end of session.   Gait Training   Gait Training Minutes (56110) 14   Symptoms Noted During/After Treatment (Gait Training) none   Treatment Detail/Skilled Intervention Patient ambulated ~ 175' with no AD and CGA. Mild lateral path deviations and inconsistent step length on the R. In rehab gym, patient ambulated up/down 4 stairs x2 reps with B railings, cues for step-to pattern and CGA. Discussion on safety with mobility due to unsteadiness ambulating without UE support. Patient requesting use of 4WW instead of FWW. Patient ambulated ~175' with 4WW and immediate improvement in gait stability observed, progressing from CGA to SBA following cueing for keeping walker closer to body. With use of 4WW, equal step length noted. Education on recommendation for use of walker for all mobility at this time.   Stair Railings present at both sides   Physical Assist/Nonphysical Assist (Stairs) 1 person assist   Level of [place] (Stairs) contact guard   PT Discharge Planning   PT Plan Anticipate 1-2 more sessions to review 4WW management/transfers and stairs then likely would be ready to transition to OP PT for balance   PT Discharge Recommendation (DC Rec) home with assist;home with outpatient physical therapy   PT Rationale for DC Rec Patient is independent with mobility at baseline, but has history of many falls. No acute changes in LE strength or sensation noted, but he presents with unsteady gait that is likely close to his baseline. Anticipate with acute skilled PT for isntruction on safe walker use and stairs navigation, patient will be able to return home from wife and anticipated assist needed for ADLs due to new R hand weakness.  Outpatient PT recommended for balance training to reduce risk for falls.   PT Brief overview of current status SBA transfers and gait with 4WW; CGA ambulating with no AD   Total Session Time   Timed Code Treatment Minutes 27   Total Session Time (sum of timed and untimed services) 49

## 2023-09-28 NOTE — CONSULTS
[facility]    Neurosurgery Consultation     Date of Admission:  9/27/2023  Date of Consult (When I saw the patient): 09/28/23    Assessment & Plan   Duane M Franek is a 73 year old male who was admitted on 9/27/2023. Duane M Franek is a 73 year old male with prior history of C3[facility], right ulnar neuropathy, left hip replacement, right knee issues presented yesterday with CC acute onset of right wrist and hand weakness with imaging evidence below.     Patient woke up around 1:30 PM on 9/27 and noticed his right wrist and hand were not working. He has chronic numbness in right fingers 2/2 ulnar neuropathy but has not had this extent of weakness before.     He denies new or worsening neck pain, radicular pain, bowel/bladder changes, gait changes, other weakness, balance issues. He admits to numbness diffusely in his right arm in no specific dermatomal distribution.     He has appointment on 10/16 with Jodi Spine MD to discuss fusion surgery which he believes is a consultation for his cervical and lumbar spine. He injured his low back last November after lifting a heavy item.     Narrative & Impression   EXAM: MR CERVICAL SPINE W/O and W CONTRAST  LOCATION: [facility]  DATE: 9/27/2023     INDICATION:  Right upper extremity weakness  COMPARISON:  Same day CTA neck.  CONTRAST: 7 mL Gadavist  TECHNIQUE: MRI Cervical Spine without and with IV contrast.     FINDINGS:   Vertebral body heights maintained. Minimal degenerative anterolisthesis of C4 on C5 and C7 on T1. C3-C5 laminectomies. Few levels of degenerative endplate edema and fatty marrow changes.  Otherwise, no suspicious marrow signal.  Focal cord signal   abnormality with cord thinning at C3-C4. No abnormal enhancement.       Postoperative changes of the dorsal paraspinal soft tissues. No prevertebral edema.     Craniovertebral junction and C1-C2: Widely patent.     C2-C3: Unremarkable  appearance of the disc. Mild bilateral facet arthropathy. No significant canal or foraminal stenosis.      C3-C4: Broad-based disc and osteophyte. Severe right and mild left facet arthropathy with ligamentum flavum hypertrophy. There is narrowing of the thecal sac with cord flattening in the transverse dimension. Severe bilateral foraminal stenosis, right   greater than left.      C4-C5: Small broad-based disc and osteophyte. Severe bilateral facet arthropathy. No significant canal stenosis. Moderate right and severe left foraminal stenosis.      C5-C6: Broad-based disc and osteophyte. Severe left and moderate right facet arthropathy. No significant canal stenosis. Mild bilateral foraminal stenosis.      C6-C7: Broad-based disc and osteophyte with ventral cord flattening. Mild to moderate bilateral facet arthropathy. Mild canal stenosis. Severe left and mild right foraminal stenosis.      C7-T1: Anterolisthesis with uncovering of the disc. Severe bilateral facet arthropathy. Mild to moderate canal stenosis with ventral cord flattening. Severe bilateral foraminal stenosis.                                                                      IMPRESSION:  1.  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  2.  Few levels of cord flattening without high-grade canal stenosis. Multilevel foraminal stenoses, as above.     Narrative & Impression   EXAM: CT HEAD W/O CONTRAST, CTA HEAD NECK W CONTRAST, CT HEAD PERFUSION W CONTRAST  LOCATION: [facility]  DATE/TIME: 9/27/2023 6:09 PM CDT     INDICATION: Code Stroke to evaluate for potential thrombolysis and thrombectomy. PLEASE READ IMMEDIATELY.  COMPARISON: None.  CONTRAST: 75 mL Isovue 370 (accession [id number]), 50mL Isovue 370 (accession [id number]), 75 mL Isovue 370 (accession [id number])  TECHNIQUE: Head and neck CT angiogram with IV contrast. Noncontrast head CT followed by axial helical CT images of the head and  neck vessels obtained during the arterial phase of intravenous contrast administration. Axial 2D reconstructed images and   multiplanar 3D MIP reconstructed images of the head and neck vessels were performed by the technologist. Additional CT cerebral perfusion was performed utilizing a second contrast bolus. Perfusion data were post processed with generation of standard   perfusion maps and estimation of ischemic/infarcted volumes utilizing standard threshold values. Dose reduction techniques were used. All stenosis measurements made according to NASCET criteria unless otherwise specified.     FINDINGS:     NONCONTRAST HEAD CT:   INTRACRANIAL CONTENTS: Hypodensities in the bilateral corona radiata, left posterior putamen, and right cerebellum compatible with age-indeterminate lacunar infarcts. Ill-defined hypoattenuation in the left centrum semiovale. Cortical gray-white matter   differentiation is relatively maintained. Somewhat ill-defined deep gray-white differentiation may be related to technical factors and/or sequela from chronic ischemic microvascular change. No hemorrhage or extraaxial collection. No mass effect.   Subarachnoid cisterns are patent. Patchy white matter hypodensities are, while nonspecific, most compatible with chronic microvascular ischemic changes. Mild generalized volume loss. No hydrocephalus.     VISUALIZED ORBITS/SINUSES/MASTOIDS: No visible intraorbital abnormality. Paranasal sinuses are clear. No middle ear or mastoid effusion.     BONES/SOFT TISSUES: No acute abnormality.     HEAD CTA:  ANTERIOR CIRCULATION: No proximal branch vessel occlusion. Atherosclerotic calcification scattered about the bilateral carotid siphons with mild bilateral paraclinoid ICA narrowing. No flow-limiting stenosis. No aneurysm or high-flow vascular   malformation.     POSTERIOR CIRCULATION: No proximal branch vessel occlusion. Mild focal atherosclerotic calcification at the V4 segment of the left vertebral  "artery. No aneurysm or high-flow vascular malformation. Hypoplastic caliber left vertebral artery with dominant   right vertebrobasilar supply.     DURAL VENOUS SINUSES: Not well evaluated on a technical basis.     NECK CTA:  RIGHT CAROTID: Calcified atherosclerotic plaque with approximately 50% cervical ICA stenosis by NASCET criteria at the carotid bifurcation. No dissection.     LEFT CAROTID: Mild atherosclerosis without hemodynamically significant stenosis by NASCET criteria at the carotid bifurcation. No dissection. Moderate stenosis at the left ECA origin.     VERTEBRAL ARTERIES: Right vertebral artery is dominant. Mild atherosclerosis at the bilateral origins and right proximal V2 segment without flow-limiting stenosis or findings of dissection. No flow-limiting stenosis or dissection.     AORTIC ARCH: Three-vessel aortic arch configuration. Mild atherosclerotic plaque about the visualized aortic arch and great vessel origins without flow-limiting stenosis.     NONVASCULAR STRUCTURES: Advanced emphysema in the visualized upper lungs with right apical pleural parenchymal scar. No neck mass or lymphadenopathy. Thyroid gland is homogenous. Advanced multilevel degenerative changes throughout the visualized cervical   and thoracic spine. Postoperative changes of previous laminectomy at C3-C5. Grade 1 anterolisthesis at C4-C5 and C7-T1. Age-indeterminate compression fracture deformity at T5 with moderate vertebral body height loss.     CT PERFUSION:  PERFUSION MAPS: Scattered small areas of prolonged Tmax within differing vascular territories is favored artifactual with patient motion noted on the QC graphs. No convincing true focal perfusion defect.     RAPID ANALYSIS:  CBF<30%: 0 mL  Tmax>6sec: 8 mL - suspected artifactual given distribution to differing vascular territories and lack of identifiable vascular lesion.  Mismatch volume: 8 mL  Mismatch ratio: \"Infinite\"                                                 "                      IMPRESSION:   HEAD CT:  1.  Multiple hypodensities compatible with age-indeterminate lacunar infarct, including bilateral corona radiata extending to centrum semiovale on the left, left putamen, and right cerebellum. Correlate with exam and prior head imaging, if available,   and/or consider MRI to further assess.  2.  Deep gray nuclei are ill-defined, which may be related to technical factors and microvascular ischemic changes. A component of recent ischemia contributing to this appearance is not excluded.  3.  No acute intracranial hemorrhage.  4.  Features compatible with sequela from chronic ischemic microvascular change and diffuse cerebral volume loss.     HEAD CTA:  1.  No proximal branch vessel occlusion, flow-limiting stenosis, aneurysm, or vascular malformation. Mild atherosclerosis about the carotid siphons and at the left V4 vertebral artery     NECK CTA:  1.  Atherosclerosis at the carotid bifurcations including approximately 50% right cervical ICA stenosis.  2.  Mild atherosclerosis at the vertebral artery origins without flow-limiting stenosis.   3.  Age indeterminate T5 compression fracture with moderate height loss. Underlying advanced spondylosis and laminectomy defects at C3-C5.     CT PERFUSION:  1.  No convincing perfusion defect allowing for patient motion artifact, as detailed.     Nacho Peterson MD notified provider, VERONICA GONSALEZ, of preliminary CT head and CTA results via telephone at 9/27/2023 6:19 PM CDT, who acknowledge understanding.     Clinical history, imaging and plans reviewed myself as well as with Dr. Luna. Dr. Luna does not recommend urgent surgical intervention based on imaging. Recommends neurology consult and EMG. Will also obtain flexion extension cervical XR. He has set follow up with spine surgeon so can either keep that scheduled or see Dr. Luna.     I have discussed the following assessment and plan with Dr. Luna who is in agreement with the  initial plan and will follow up with further consultation recommendations.    Briseida Pham PA-C  [facility]  [facility]  [address]  [address]  [place] [id number]    Tel [phone]  Pager [phone]      Code Status    No CPR- Do NOT Intubate    Reason for Consult   Reason for consult: I was asked by Dr. Haney to evaluate this patient for cervical stenosis, RUE weakness.    Primary Care Physician   [facility]    Chief Complaint   Right wrist and hand weakness    History is obtained from the patient, electronic health record, and emergency department physician    History of Present Illness   Duane M Franek is a 73 year old male with prior history of [facility], right ulnar neuropathy, left hip replacement, right knee issues presented yesterday with CC acute onset of right wrist and hand weakness with imaging evidence below.     Patient woke up around 1:30 PM on 9/27 and noticed his right wrist and hand were not working. He has chronic numbness in right fingers 2/2 ulnar neuropathy but has not had this extent of weakness before.     He denies new or worsening neck pain, radicular pain, bowel/bladder changes, gait changes, other weakness, balance issues. He admits to numbness diffusely in his right arm in no specific dermatomal distribution.     He has appointment on 10/16 with Jodi Spine MD to discuss fusion surgery which he believes is a consultation for his cervical and lumbar spine. He injured his low back last November after lifting a heavy item.     Narrative & Impression   EXAM: MR CERVICAL SPINE W/O and W CONTRAST  LOCATION: [facility]  DATE: 9/27/2023     INDICATION:  Right upper extremity weakness  COMPARISON:  Same day CTA neck.  CONTRAST: 7 mL Gadavist  TECHNIQUE: MRI Cervical Spine without and with IV contrast.     FINDINGS:   Vertebral body heights maintained. Minimal  degenerative anterolisthesis of C4 on C5 and C7 on T1. C3-C5 laminectomies. Few levels of degenerative endplate edema and fatty marrow changes.  Otherwise, no suspicious marrow signal.  Focal cord signal   abnormality with cord thinning at C3-C4. No abnormal enhancement.       Postoperative changes of the dorsal paraspinal soft tissues. No prevertebral edema.     Craniovertebral junction and C1-C2: Widely patent.     C2-C3: Unremarkable appearance of the disc. Mild bilateral facet arthropathy. No significant canal or foraminal stenosis.      C3-C4: Broad-based disc and osteophyte. Severe right and mild left facet arthropathy with ligamentum flavum hypertrophy. There is narrowing of the thecal sac with cord flattening in the transverse dimension. Severe bilateral foraminal stenosis, right   greater than left.      C4-C5: Small broad-based disc and osteophyte. Severe bilateral facet arthropathy. No significant canal stenosis. Moderate right and severe left foraminal stenosis.      C5-C6: Broad-based disc and osteophyte. Severe left and moderate right facet arthropathy. No significant canal stenosis. Mild bilateral foraminal stenosis.      C6-C7: Broad-based disc and osteophyte with ventral cord flattening. Mild to moderate bilateral facet arthropathy. Mild canal stenosis. Severe left and mild right foraminal stenosis.      C7-T1: Anterolisthesis with uncovering of the disc. Severe bilateral facet arthropathy. Mild to moderate canal stenosis with ventral cord flattening. Severe bilateral foraminal stenosis.                                                                      IMPRESSION:  1.  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  2.  Few levels of cord flattening without high-grade canal stenosis. Multilevel foraminal stenoses, as above.     Narrative & Impression   EXAM: CT HEAD W/O CONTRAST, CTA HEAD NECK W CONTRAST, CT HEAD PERFUSION W CONTRAST  LOCATION: [facility]  DATE/TIME: 9/27/2023 6:09 PM CDT     INDICATION: Code Stroke to evaluate for potential thrombolysis and thrombectomy. PLEASE READ IMMEDIATELY.  COMPARISON: None.  CONTRAST: 75 mL Isovue 370 (accession [id number]), 50mL Isovue 370 (accession [id number]), 75 mL Isovue 370 (accession [id number])  TECHNIQUE: Head and neck CT angiogram with IV contrast. Noncontrast head CT followed by axial helical CT images of the head and neck vessels obtained during the arterial phase of intravenous contrast administration. Axial 2D reconstructed images and   multiplanar 3D MIP reconstructed images of the head and neck vessels were performed by the technologist. Additional CT cerebral perfusion was performed utilizing a second contrast bolus. Perfusion data were post processed with generation of standard   perfusion maps and estimation of ischemic/infarcted volumes utilizing standard threshold values. Dose reduction techniques were used. All stenosis measurements made according to NASCET criteria unless otherwise specified.     FINDINGS:     NONCONTRAST HEAD CT:   INTRACRANIAL CONTENTS: Hypodensities in the bilateral corona radiata, left posterior putamen, and right cerebellum compatible with age-indeterminate lacunar infarcts. Ill-defined hypoattenuation in the left centrum semiovale. Cortical gray-white matter   differentiation is relatively maintained. Somewhat ill-defined deep gray-white differentiation may be related to technical factors and/or sequela from chronic ischemic microvascular change. No hemorrhage or extraaxial collection. No mass effect.   Subarachnoid cisterns are patent. Patchy white matter hypodensities are, while nonspecific, most compatible with chronic microvascular ischemic changes. Mild generalized volume loss. No hydrocephalus.     VISUALIZED ORBITS/SINUSES/MASTOIDS: No visible intraorbital abnormality. Paranasal sinuses are clear. No middle ear or mastoid effusion.     BONES/SOFT  TISSUES: No acute abnormality.     HEAD CTA:  ANTERIOR CIRCULATION: No proximal branch vessel occlusion. Atherosclerotic calcification scattered about the bilateral carotid siphons with mild bilateral paraclinoid ICA narrowing. No flow-limiting stenosis. No aneurysm or high-flow vascular   malformation.     POSTERIOR CIRCULATION: No proximal branch vessel occlusion. Mild focal atherosclerotic calcification at the V4 segment of the left vertebral artery. No aneurysm or high-flow vascular malformation. Hypoplastic caliber left vertebral artery with dominant   right vertebrobasilar supply.     DURAL VENOUS SINUSES: Not well evaluated on a technical basis.     NECK CTA:  RIGHT CAROTID: Calcified atherosclerotic plaque with approximately 50% cervical ICA stenosis by NASCET criteria at the carotid bifurcation. No dissection.     LEFT CAROTID: Mild atherosclerosis without hemodynamically significant stenosis by NASCET criteria at the carotid bifurcation. No dissection. Moderate stenosis at the left ECA origin.     VERTEBRAL ARTERIES: Right vertebral artery is dominant. Mild atherosclerosis at the bilateral origins and right proximal V2 segment without flow-limiting stenosis or findings of dissection. No flow-limiting stenosis or dissection.     AORTIC ARCH: Three-vessel aortic arch configuration. Mild atherosclerotic plaque about the visualized aortic arch and great vessel origins without flow-limiting stenosis.     NONVASCULAR STRUCTURES: Advanced emphysema in the visualized upper lungs with right apical pleural parenchymal scar. No neck mass or lymphadenopathy. Thyroid gland is homogenous. Advanced multilevel degenerative changes throughout the visualized cervical   and thoracic spine. Postoperative changes of previous laminectomy at C3-C5. Grade 1 anterolisthesis at C4-C5 and C7-T1. Age-indeterminate compression fracture deformity at T5 with moderate vertebral body height loss.     CT PERFUSION:  PERFUSION MAPS:  "Scattered small areas of prolonged Tmax within differing vascular territories is favored artifactual with patient motion noted on the QC graphs. No convincing true focal perfusion defect.     RAPID ANALYSIS:  CBF<30%: 0 mL  Tmax>6sec: 8 mL - suspected artifactual given distribution to differing vascular territories and lack of identifiable vascular lesion.  Mismatch volume: 8 mL  Mismatch ratio: \"Infinite\"                                                                      IMPRESSION:   HEAD CT:  1.  Multiple hypodensities compatible with age-indeterminate lacunar infarct, including bilateral corona radiata extending to centrum semiovale on the left, left putamen, and right cerebellum. Correlate with exam and prior head imaging, if available,   and/or consider MRI to further assess.  2.  Deep gray nuclei are ill-defined, which may be related to technical factors and microvascular ischemic changes. A component of recent ischemia contributing to this appearance is not excluded.  3.  No acute intracranial hemorrhage.  4.  Features compatible with sequela from chronic ischemic microvascular change and diffuse cerebral volume loss.     HEAD CTA:  1.  No proximal branch vessel occlusion, flow-limiting stenosis, aneurysm, or vascular malformation. Mild atherosclerosis about the carotid siphons and at the left V4 vertebral artery     NECK CTA:  1.  Atherosclerosis at the carotid bifurcations including approximately 50% right cervical ICA stenosis.  2.  Mild atherosclerosis at the vertebral artery origins without flow-limiting stenosis.   3.  Age indeterminate T5 compression fracture with moderate height loss. Underlying advanced spondylosis and laminectomy defects at C3-C5.     CT PERFUSION:  1.  No convincing perfusion defect allowing for patient motion artifact, as detailed.     Nacho Peterson MD notified provider, VERONICA GONSALEZ, of preliminary CT head and CTA results via telephone at 9/27/2023 6:19 PM CDT, who " acknowledge understanding.       Past Medical History   I have reviewed this patient's medical history and updated it with pertinent information if needed.   No past medical history on file.    Past Surgical History   I have reviewed this patient's surgical history and updated it with pertinent information if needed.  Past Surgical History:   Procedure Laterality Date    BACK SURGERY N/A 2012    Decompression C3-C5    JOINT REPLACEMENT, HIP RT/LT  2013    Joint Replacement Hip LT       Prior to Admission Medications   Prior to Admission Medications   Prescriptions Last Dose Informant Patient Reported? Taking?   Flaxseed, Linseed, (FLAX SEED OIL) 1000 MG capsule 9/27/2023 at am  Yes Yes   Sig: Take 1 capsule by mouth daily   albuterol (PROAIR HFA/PROVENTIL HFA/VENTOLIN HFA) 108 (90 Base) MCG/ACT inhaler prn Self Yes No   Sig: Inhale 2 puffs into the lungs 4 times daily as needed for shortness of breath   amLODIPine (NORVASC) 10 MG tablet 9/27/2023 at am Self Yes Yes   Sig: Take 10 mg by mouth daily   ibuprofen (ADVIL/MOTRIN) 200 MG tablet 9/27/2023 Self Yes Yes   Sig: Take 400-600 mg by mouth 2 times daily as needed for pain   lisinopril (ZESTRIL) 10 MG tablet 9/27/2023 at am Self Yes Yes   Sig: Take 10 mg by mouth daily   multivitamin, therapeutic (THERA-VIT) TABS tablet 9/27/2023 at am  Yes Yes   Sig: Take 1 tablet by mouth daily   sertraline (ZOLOFT) 100 MG tablet 9/27/2023 at am Self Yes Yes   Sig: Take 150 mg by mouth daily   vitamin B-12 (CYANOCOBALAMIN) 100 MCG tablet 9/27/2023 at am Self Yes Yes   Sig: Take 100 mcg by mouth daily      Facility-Administered Medications: None     Allergies   Allergies   Allergen Reactions    Iodine Hives     Per patient at 12- OV, he can ingest seafood if he takes an antihistamine before meal    Varenicline Anxiety    Iodinated Contrast Media Hives       Social History   I have reviewed this patient's social history and updated it with pertinent information if needed.  Duane M Franek  reports that he has been smoking cigarettes. He has a 78.00 pack-year smoking history. He has never used smokeless tobacco. He reports current alcohol use. He reports that he does not use drugs.    Family History   I have reviewed this patient's family history and updated it with pertinent information if needed.   Family History   Problem Relation Age of Onset    Other Cancer Mother 86         at 87 - believed to be ovarian        Review of Systems    ROS: 10 point ROS neg other than the symptoms noted above in the HPI.      Physical Exam   Temp: 98.7  F (37.1  C) Temp src: Oral BP: (!) 153/75 Pulse: 72   Resp: 16 SpO2: 96 % O2 Device: None (Room air)    Vital Signs with Ranges  Temp:  [98  F (36.7  C)-98.7  F (37.1  C)] 98.7  F (37.1  C)  Pulse:  [61-76] 72  Resp:  [11-23] 16  BP: (131-153)/(62-87) 153/75  SpO2:  [91 %-97 %] 96 %  160 lbs 11.45 oz     , Blood pressure (!) 153/75, pulse 72, temperature 98.7  F (37.1  C), temperature source Oral, resp. rate 16, weight 160 lb 11.5 oz (72.9 kg), SpO2 96 %.  160 lbs 11.45 oz    NEUROLOGICAL EXAMINATION:   Mental status:  Alert and appropriate, speech is fluent.  Cranial nerves:  II-XII grossly intact.   Motor:  Strength is 5/5 throughout the upper and lower extremities  Shoulder Abduction:  Right:  5/5   Left:  5/5  Biceps:                      Right:  5/5   Left:  5/5  Triceps:                     Right:  5/5   Left:  5/5  Wrist Extensors:       Right:  0/5   Left:  5/5  Wrist Flexors:           Right:  0/5   Left:  5/5  interosseus :            Right:  2/5   Left:  5/5   Hip Flexor:                Right: 5/5  Left:  5/5  Hip Adductor:             Right:  5/5  Left:  5/5  Hip Abductor:             Right:  5/5  Left:  5/5  Gastroc Soleus:        Right:  5/5  Left:  5/5  Tib/Ant:                      Right:  5/5  Left:  5/5  EHL:                     Right:  5/5  Left:  5/5  Sensation:  decreased sensation diffusely in RUE compared to  left  Reflexes:  DTR 1 at tricep, 2 bicep, 2 patellar and symmetric. Negative Clonus.      Cervical examination reveals good range of motion.  No tenderness to palpation of the cervical spine or paraspinous muscles bilaterally.     Negative spurling's  b ilaterally    Data   All new lab and imaging data was personally reviewed by me.  Narrative & Impression   EXAM: MR CERVICAL SPINE W/O and W CONTRAST  LOCATION: [facility]  DATE: 9/27/2023     INDICATION:  Right upper extremity weakness  COMPARISON:  Same day CTA neck.  CONTRAST: 7 mL Gadavist  TECHNIQUE: MRI Cervical Spine without and with IV contrast.     FINDINGS:   Vertebral body heights maintained. Minimal degenerative anterolisthesis of C4 on C5 and C7 on T1. C3-C5 laminectomies. Few levels of degenerative endplate edema and fatty marrow changes.  Otherwise, no suspicious marrow signal.  Focal cord signal   abnormality with cord thinning at C3-C4. No abnormal enhancement.       Postoperative changes of the dorsal paraspinal soft tissues. No prevertebral edema.     Craniovertebral junction and C1-C2: Widely patent.     C2-C3: Unremarkable appearance of the disc. Mild bilateral facet arthropathy. No significant canal or foraminal stenosis.      C3-C4: Broad-based disc and osteophyte. Severe right and mild left facet arthropathy with ligamentum flavum hypertrophy. There is narrowing of the thecal sac with cord flattening in the transverse dimension. Severe bilateral foraminal stenosis, right   greater than left.      C4-C5: Small broad-based disc and osteophyte. Severe bilateral facet arthropathy. No significant canal stenosis. Moderate right and severe left foraminal stenosis.      C5-C6: Broad-based disc and osteophyte. Severe left and moderate right facet arthropathy. No significant canal stenosis. Mild bilateral foraminal stenosis.      C6-C7: Broad-based disc and osteophyte with ventral cord flattening. Mild to moderate bilateral  facet arthropathy. Mild canal stenosis. Severe left and mild right foraminal stenosis.      C7-T1: Anterolisthesis with uncovering of the disc. Severe bilateral facet arthropathy. Mild to moderate canal stenosis with ventral cord flattening. Severe bilateral foraminal stenosis.                                                                      IMPRESSION:  1.  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  2.  Few levels of cord flattening without high-grade canal stenosis. Multilevel foraminal stenoses, as above.     Narrative & Impression   EXAM: CT HEAD W/O CONTRAST, CTA HEAD NECK W CONTRAST, CT HEAD PERFUSION W CONTRAST  LOCATION: [facility]  DATE/TIME: 9/27/2023 6:09 PM CDT     INDICATION: Code Stroke to evaluate for potential thrombolysis and thrombectomy. PLEASE READ IMMEDIATELY.  COMPARISON: None.  CONTRAST: 75 mL Isovue 370 (accession [id number]), 50mL Isovue 370 (accession [id number]), 75 mL Isovue 370 (accession [id number])  TECHNIQUE: Head and neck CT angiogram with IV contrast. Noncontrast head CT followed by axial helical CT images of the head and neck vessels obtained during the arterial phase of intravenous contrast administration. Axial 2D reconstructed images and   multiplanar 3D MIP reconstructed images of the head and neck vessels were performed by the technologist. Additional CT cerebral perfusion was performed utilizing a second contrast bolus. Perfusion data were post processed with generation of standard   perfusion maps and estimation of ischemic/infarcted volumes utilizing standard threshold values. Dose reduction techniques were used. All stenosis measurements made according to NASCET criteria unless otherwise specified.     FINDINGS:     NONCONTRAST HEAD CT:   INTRACRANIAL CONTENTS: Hypodensities in the bilateral corona radiata, left posterior putamen, and right cerebellum compatible with age-indeterminate lacunar infarcts.  Ill-defined hypoattenuation in the left centrum semiovale. Cortical gray-white matter   differentiation is relatively maintained. Somewhat ill-defined deep gray-white differentiation may be related to technical factors and/or sequela from chronic ischemic microvascular change. No hemorrhage or extraaxial collection. No mass effect.   Subarachnoid cisterns are patent. Patchy white matter hypodensities are, while nonspecific, most compatible with chronic microvascular ischemic changes. Mild generalized volume loss. No hydrocephalus.     VISUALIZED ORBITS/SINUSES/MASTOIDS: No visible intraorbital abnormality. Paranasal sinuses are clear. No middle ear or mastoid effusion.     BONES/SOFT TISSUES: No acute abnormality.     HEAD CTA:  ANTERIOR CIRCULATION: No proximal branch vessel occlusion. Atherosclerotic calcification scattered about the bilateral carotid siphons with mild bilateral paraclinoid ICA narrowing. No flow-limiting stenosis. No aneurysm or high-flow vascular   malformation.     POSTERIOR CIRCULATION: No proximal branch vessel occlusion. Mild focal atherosclerotic calcification at the V4 segment of the left vertebral artery. No aneurysm or high-flow vascular malformation. Hypoplastic caliber left vertebral artery with dominant   right vertebrobasilar supply.     DURAL VENOUS SINUSES: Not well evaluated on a technical basis.     NECK CTA:  RIGHT CAROTID: Calcified atherosclerotic plaque with approximately 50% cervical ICA stenosis by NASCET criteria at the carotid bifurcation. No dissection.     LEFT CAROTID: Mild atherosclerosis without hemodynamically significant stenosis by NASCET criteria at the carotid bifurcation. No dissection. Moderate stenosis at the left ECA origin.     VERTEBRAL ARTERIES: Right vertebral artery is dominant. Mild atherosclerosis at the bilateral origins and right proximal V2 segment without flow-limiting stenosis or findings of dissection. No flow-limiting stenosis or dissection.    "  AORTIC ARCH: Three-vessel aortic arch configuration. Mild atherosclerotic plaque about the visualized aortic arch and great vessel origins without flow-limiting stenosis.     NONVASCULAR STRUCTURES: Advanced emphysema in the visualized upper lungs with right apical pleural parenchymal scar. No neck mass or lymphadenopathy. Thyroid gland is homogenous. Advanced multilevel degenerative changes throughout the visualized cervical   and thoracic spine. Postoperative changes of previous laminectomy at C3-C5. Grade 1 anterolisthesis at C4-C5 and C7-T1. Age-indeterminate compression fracture deformity at T5 with moderate vertebral body height loss.     CT PERFUSION:  PERFUSION MAPS: Scattered small areas of prolonged Tmax within differing vascular territories is favored artifactual with patient motion noted on the QC graphs. No convincing true focal perfusion defect.     RAPID ANALYSIS:  CBF<30%: 0 mL  Tmax>6sec: 8 mL - suspected artifactual given distribution to differing vascular territories and lack of identifiable vascular lesion.  Mismatch volume: 8 mL  Mismatch ratio: \"Infinite\"                                                                      IMPRESSION:   HEAD CT:  1.  Multiple hypodensities compatible with age-indeterminate lacunar infarct, including bilateral corona radiata extending to centrum semiovale on the left, left putamen, and right cerebellum. Correlate with exam and prior head imaging, if available,   and/or consider MRI to further assess.  2.  Deep gray nuclei are ill-defined, which may be related to technical factors and microvascular ischemic changes. A component of recent ischemia contributing to this appearance is not excluded.  3.  No acute intracranial hemorrhage.  4.  Features compatible with sequela from chronic ischemic microvascular change and diffuse cerebral volume loss.     HEAD CTA:  1.  No proximal branch vessel occlusion, flow-limiting stenosis, aneurysm, or vascular malformation. " Mild atherosclerosis about the carotid siphons and at the left V4 vertebral artery     NECK CTA:  1.  Atherosclerosis at the carotid bifurcations including approximately 50% right cervical ICA stenosis.  2.  Mild atherosclerosis at the vertebral artery origins without flow-limiting stenosis.   3.  Age indeterminate T5 compression fracture with moderate height loss. Underlying advanced spondylosis and laminectomy defects at C3-C5.     CT PERFUSION:  1.  No convincing perfusion defect allowing for patient motion artifact, as detailed.     Nacho Peterson MD notified provider, VERONICA GONSALEZ, of preliminary CT head and CTA results via telephone at 9/27/2023 6:19 PM CDT, who acknowledge understanding.     CBC RESULTS:   Recent Labs   Lab Test 09/27/23  1758   WBC 5.8   RBC 3.60*   HGB 13.0*   HCT 36.5*   *   MCH 36.1*   MCHC 35.6   RDW 11.7        Basic Metabolic Panel:  Lab Results   Component Value Date     09/27/2023      Lab Results   Component Value Date    POTASSIUM 4.4 09/27/2023     Lab Results   Component Value Date    CHLORIDE 94 09/27/2023     Lab Results   Component Value Date    MINA 8.7 09/27/2023     Lab Results   Component Value Date    CO2 24 09/27/2023     Lab Results   Component Value Date    BUN 5.6 09/27/2023     Lab Results   Component Value Date    CR 0.70 09/27/2023     Lab Results   Component Value Date    GLC 80 09/27/2023     INR:  Lab Results   Component Value Date    INR 0.87 09/27/2023

## 2023-09-28 NOTE — H&P
Assessment/Plan    73M hx of HTN, emphasema, depression and R ulnar neuropathy presenting with acute RUE weakness. Found to have age indeterminate lacunar infarcts.     Acute on chronic RUE weakness, unclear etiology  Age indeterminate lacunar infarcts  `Patient presented with RUE weakness 4hrs prior to presentation upon waking up from a nap. Has hx of Ulnar neuropathy on the same side but stated that weakness was new  `vitally stable on presentation  `Labs unremarkable apart from mild hypoNa/Macrocytosis/HypoCalcemia that do not require intervention. ETOH was positive at 0.21  `Physical exam reveals moderately reduced  in R hand, 4/5 strength R arm/leg. No sensation deficits to light touch.   `CTA/perfusion Head: Mild atherosclerosis about the carotid siphons and at the left V4 vertebral artery. No convincing perfusion defect allowing for patient motion artifact   `CTA neck: Atherosclerosis at the carotid bifurcations including approximately 50% right cervical ICA stenosis. Mild atherosclerosis at the vertebral artery origins without flow-limiting stenosis  `Head CT: Multiple hypodensities compatible with age-indeterminate lacunar infarct, including bilateral corona radiata extending to centrum semiovale on the left, left putamen, and right cerebellum.   `MRI Brain/C Spine: No acute intracranial process. Generalized brain atrophy and presumed microvascular ischemic changes. 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  Few levels of cord flattening without high-grade canal stenosis. Multilevel foraminal stenoses, as above.   --Neuro stroke consulted by ED, however given negative MRI, will instead consult general neurology per Neuro Stroke's recommendations  --NPO until swallow eval, with D5NS running  --PT, OT (patient is R handed)  --Case management  --TTE  --Neuro checks q4  --A1c, Lipid panel  --Per EMR chart review, patient is on aspirin, presumably for hx of  "atherosclerosis. Will continue aspirin.   --Will hold off on initating a 2nd antiplatelet therapy such as plavix or a statin, until otherwise recommended by neurology.     Essential HTN  CAD  Nicotine use disorder  `EMR states patient had evidence of atherosclerosis on a Low dose Chest CT pending \"CV risk management\"  `-150s on presentation, remaining vitals wnl  `reports that he is on 2 meds but cannot remember their name (EMR suggests lisonpril 10 and amlo 5 or 10mg)  --Will not initiate Antihypertensives for 24-48hrs for permissive hypertension irrespective of negative MRI  --Lipid/A1c  --Pharmacy to confirm medications  --Nicotine dependancy referral    Pulmonary Emphysema  Solitary Nodule of Lung  `Hx of pET [facility] that was [facility], [facility]  `Hx of Emphesema based only on imaging and not symptoms  --Duonebs prn    DELONTE  `doesn't use CPAP  --no acute intervention    Depression  `mood stable  `on setraline (likely 100mg)  --continue sertraline once confirmed    HypoNatremia  Mild Macrocytic Anemia  `Na 132, Hb 13,   --Folate/b12  --no acute intervention    Spondylosis with Radiculopathy  Chronic Back pain  Right Ulnar Neuropathy   `CTA neck showed: Underlying advanced spondylosis and laminectomy defects at C3-C5   `Patient states he's pending \"vertebral fusion' procedure  --Neurology consulted  --No acute intervention    Supportive Care  DVT ppx: mechanical  Diet: npo for now  Pain Control: tylenol  Upper GI ppx: prn zofran  Lower GI ppx: prn senna  Lines/Catheters: IV only  TTE/Dopplers: TTE only  Contact Precautions: none  PT/OT/Social/Wound/Palliative Consults: PT/OT/Social  Code Status: DNR DNI per patients wish after discussing Code status    History of Present Illness  Subjective: Patient awoke at 1:30pm (4hrs prior to presentation) with progressive RUE weakness. He has chronic numbness in R finger tips 2/2 ulver neuropathy but stated weakness was new. No other symptoms in the " face/extremeties were noted by the patient. Denied fevers/chills/dysphagia/dysphonia.    ED course: 1L bolus given, benadrul given. Neuro stroke called.     ROS: 10 point ROS neg other than the symptoms noted above in the HPI.     Past Medical Hx: No past medical history on file.     Home Medications: Present in Med Rec    Allergies:   Allergies   Allergen Reactions    Iodine Hives     Per patient at 2015 OV, he can ingest seafood if he takes an antihistamine before meal    Varenicline Anxiety    Iodinated Contrast Media Hives        Pertinent Family Hx:   Family History   Problem Relation Age of Onset    Other Cancer Mother 86         at 87 - believed to be ovarian         Surgical Hx:   Past Surgical History:   Procedure Laterality Date    BACK SURGERY N/A     Decompression C3-C5    JOINT REPLACEMENT, HIP RT/LT      Joint Replacement Hip LT        Substance Hx:   History   Drug Use No   ,  Social History    Substance and Sexual Activity      Alcohol use: Yes        Alcohol/week: 0.0 standard drinks of alcohol        Comment: Beer  ,   History   Smoking Status    Every Day    Packs/day: 1.50    Years: 52.00    Types: Cigarettes   Smokeless Tobacco    Never   ,   History   Sexual Activity    Sexual activity: Never        Environment/ADLs: independant      Imaging/Labs    Imaging: CT Head w/o Contrast    Result Date: 2023  EXAM: CT HEAD W/O CONTRAST, CTA HEAD NECK W CONTRAST, CT HEAD PERFUSION W CONTRAST LOCATION: [facility] DATE/TIME: 2023 6:09 PM CDT INDICATION: Code Stroke to evaluate for potential thrombolysis and thrombectomy. PLEASE READ IMMEDIATELY. COMPARISON: None. CONTRAST: 75 mL Isovue 370 (accession [id number]), 50mL Isovue 370 (accession [id number]), 75 mL Isovue 370 (accession [id number]) TECHNIQUE: Head and neck CT angiogram with IV contrast. Noncontrast head CT followed by axial helical CT images of the head and neck vessels obtained during the  arterial phase of intravenous contrast administration. Axial 2D reconstructed images and multiplanar 3D MIP reconstructed images of the head and neck vessels were performed by the technologist. Additional CT cerebral perfusion was performed utilizing a second contrast bolus. Perfusion data were post processed with generation of standard perfusion maps and estimation of ischemic/infarcted volumes utilizing standard threshold values. Dose reduction techniques were used. All stenosis measurements made according to NASCET criteria unless otherwise specified. FINDINGS: NONCONTRAST HEAD CT: INTRACRANIAL CONTENTS: Hypodensities in the bilateral corona radiata, left posterior putamen, and right cerebellum compatible with age-indeterminate lacunar infarcts. Ill-defined hypoattenuation in the left centrum semiovale. Cortical gray-white matter differentiation is relatively maintained. Somewhat ill-defined deep gray-white differentiation may be related to technical factors and/or sequela from chronic ischemic microvascular change. No hemorrhage or extraaxial collection. No mass effect. Subarachnoid cisterns are patent. Patchy white matter hypodensities are, while nonspecific, most compatible with chronic microvascular ischemic changes. Mild generalized volume loss. No hydrocephalus. VISUALIZED ORBITS/SINUSES/MASTOIDS: No visible intraorbital abnormality. Paranasal sinuses are clear. No middle ear or mastoid effusion. BONES/SOFT TISSUES: No acute abnormality. HEAD CTA: ANTERIOR CIRCULATION: No proximal branch vessel occlusion. Atherosclerotic calcification scattered about the bilateral carotid siphons with mild bilateral paraclinoid ICA narrowing. No flow-limiting stenosis. No aneurysm or high-flow vascular malformation. POSTERIOR CIRCULATION: No proximal branch vessel occlusion. Mild focal atherosclerotic calcification at the V4 segment of the left vertebral artery. No aneurysm or high-flow vascular malformation. Hypoplastic  "caliber left vertebral artery with dominant right vertebrobasilar supply. DURAL VENOUS SINUSES: Not well evaluated on a technical basis. NECK CTA: RIGHT CAROTID: Calcified atherosclerotic plaque with approximately 50% cervical ICA stenosis by NASCET criteria at the carotid bifurcation. No dissection. LEFT CAROTID: Mild atherosclerosis without hemodynamically significant stenosis by NASCET criteria at the carotid bifurcation. No dissection. Moderate stenosis at the left ECA origin. VERTEBRAL ARTERIES: Right vertebral artery is dominant. Mild atherosclerosis at the bilateral origins and right proximal V2 segment without flow-limiting stenosis or findings of dissection. No flow-limiting stenosis or dissection. AORTIC ARCH: Three-vessel aortic arch configuration. Mild atherosclerotic plaque about the visualized aortic arch and great vessel origins without flow-limiting stenosis. NONVASCULAR STRUCTURES: Advanced emphysema in the visualized upper lungs with right apical pleural parenchymal scar. No neck mass or lymphadenopathy. Thyroid gland is homogenous. Advanced multilevel degenerative changes throughout the visualized cervical  and thoracic spine. Postoperative changes of previous laminectomy at C3-C5. Grade 1 anterolisthesis at C4-C5 and C7-T1. Age-indeterminate compression fracture deformity at T5 with moderate vertebral body height loss. CT PERFUSION: PERFUSION MAPS: Scattered small areas of prolonged Tmax within differing vascular territories is favored artifactual with patient motion noted on the QC graphs. No convincing true focal perfusion defect. RAPID ANALYSIS: CBF<30%: 0 mL Tmax>6sec: 8 mL - suspected artifactual given distribution to differing vascular territories and lack of identifiable vascular lesion. Mismatch volume: 8 mL Mismatch ratio: \"Infinite\"     IMPRESSION: HEAD CT: 1.  Multiple hypodensities compatible with age-indeterminate lacunar infarct, including bilateral corona radiata extending to " centrum semiovale on the left, left putamen, and right cerebellum. Correlate with exam and prior head imaging, if available, and/or consider MRI to further assess. 2.  Deep gray nuclei are ill-defined, which may be related to technical factors and microvascular ischemic changes. A component of recent ischemia contributing to this appearance is not excluded. 3.  No acute intracranial hemorrhage. 4.  Features compatible with sequela from chronic ischemic microvascular change and diffuse cerebral volume loss. HEAD CTA: 1.  No proximal branch vessel occlusion, flow-limiting stenosis, aneurysm, or vascular malformation. Mild atherosclerosis about the carotid siphons and at the left V4 vertebral artery NECK CTA: 1.  Atherosclerosis at the carotid bifurcations including approximately 50% right cervical ICA stenosis. 2.  Mild atherosclerosis at the vertebral artery origins without flow-limiting stenosis. 3.  Age indeterminate T5 compression fracture with moderate height loss. Underlying advanced spondylosis and laminectomy defects at C3-C5. CT PERFUSION: 1.  No convincing perfusion defect allowing for patient motion artifact, as detailed. Nacho Peterson MD notified provider, VERONICA GONSALEZ, of preliminary CT head and CTA results via telephone at 9/27/2023 6:19 PM CDT, who acknowledge understanding.    CTA Head Neck with Contrast    Result Date: 9/27/2023  EXAM: CT HEAD W/O CONTRAST, CTA HEAD NECK W CONTRAST, CT HEAD PERFUSION W CONTRAST LOCATION: [facility] DATE/TIME: 9/27/2023 6:09 PM CDT INDICATION: Code Stroke to evaluate for potential thrombolysis and thrombectomy. PLEASE READ IMMEDIATELY. COMPARISON: None. CONTRAST: 75 mL Isovue 370 (accession [id number]), 50mL Isovue 370 (accession [id number]), 75 mL Isovue 370 (accession [id number]) TECHNIQUE: Head and neck CT angiogram with IV contrast. Noncontrast head CT followed by axial helical CT images of the head and neck vessels obtained during the  centrum semiovale on the left, left putamen, and right cerebellum. Correlate with exam and prior head imaging, if available, and/or consider MRI to further assess. 2.  Deep gray nuclei are ill-defined, which may be related to technical factors and microvascular ischemic changes. A component of recent ischemia contributing to this appearance is not excluded. 3.  No acute intracranial hemorrhage. 4.  Features compatible with sequela from chronic ischemic microvascular change and diffuse cerebral volume loss. HEAD CTA: 1.  No proximal branch vessel occlusion, flow-limiting stenosis, aneurysm, or vascular malformation. Mild atherosclerosis about the carotid siphons and at the left V4 vertebral artery NECK CTA: 1.  Atherosclerosis at the carotid bifurcations including approximately 50% right cervical ICA stenosis. 2.  Mild atherosclerosis at the vertebral artery origins without flow-limiting stenosis. 3.  Age indeterminate T5 compression fracture with moderate height loss. Underlying advanced spondylosis and laminectomy defects at C3-C5. CT PERFUSION: 1.  No convincing perfusion defect allowing for patient motion artifact, as detailed. Nacho Peterson MD notified provider, VERONICA GONSALEZ, of preliminary CT head and CTA results via telephone at 9/27/2023 6:19 PM CDT, who acknowledge understanding.    CT Head Perfusion w Contrast - For Tier 2 Stroke    Result Date: 9/27/2023  EXAM: CT HEAD W/O CONTRAST, CTA HEAD NECK W CONTRAST, CT HEAD PERFUSION W CONTRAST LOCATION: [facility] DATE/TIME: 9/27/2023 6:09 PM CDT INDICATION: Code Stroke to evaluate for potential thrombolysis and thrombectomy. PLEASE READ IMMEDIATELY. COMPARISON: None. CONTRAST: 75 mL Isovue 370 (accession [id number]), 50mL Isovue 370 (accession [id number]), 75 mL Isovue 370 (accession [id number]) TECHNIQUE: Head and neck CT angiogram with IV contrast. Noncontrast head CT followed by axial helical CT images of the head and neck vessels  obtained during the arterial phase of intravenous contrast administration. Axial 2D reconstructed images and multiplanar 3D MIP reconstructed images of the head and neck vessels were performed by the technologist. Additional CT cerebral perfusion was performed utilizing a second contrast bolus. Perfusion data were post processed with generation of standard perfusion maps and estimation of ischemic/infarcted volumes utilizing standard threshold values. Dose reduction techniques were used. All stenosis measurements made according to NASCET criteria unless otherwise specified. FINDINGS: NONCONTRAST HEAD CT: INTRACRANIAL CONTENTS: Hypodensities in the bilateral corona radiata, left posterior putamen, and right cerebellum compatible with age-indeterminate lacunar infarcts. Ill-defined hypoattenuation in the left centrum semiovale. Cortical gray-white matter differentiation is relatively maintained. Somewhat ill-defined deep gray-white differentiation may be related to technical factors and/or sequela from chronic ischemic microvascular change. No hemorrhage or extraaxial collection. No mass effect. Subarachnoid cisterns are patent. Patchy white matter hypodensities are, while nonspecific, most compatible with chronic microvascular ischemic changes. Mild generalized volume loss. No hydrocephalus. VISUALIZED ORBITS/SINUSES/MASTOIDS: No visible intraorbital abnormality. Paranasal sinuses are clear. No middle ear or mastoid effusion. BONES/SOFT TISSUES: No acute abnormality. HEAD CTA: ANTERIOR CIRCULATION: No proximal branch vessel occlusion. Atherosclerotic calcification scattered about the bilateral carotid siphons with mild bilateral paraclinoid ICA narrowing. No flow-limiting stenosis. No aneurysm or high-flow vascular malformation. POSTERIOR CIRCULATION: No proximal branch vessel occlusion. Mild focal atherosclerotic calcification at the V4 segment of the left vertebral artery. No aneurysm or high-flow vascular  "malformation. Hypoplastic caliber left vertebral artery with dominant right vertebrobasilar supply. DURAL VENOUS SINUSES: Not well evaluated on a technical basis. NECK CTA: RIGHT CAROTID: Calcified atherosclerotic plaque with approximately 50% cervical ICA stenosis by NASCET criteria at the carotid bifurcation. No dissection. LEFT CAROTID: Mild atherosclerosis without hemodynamically significant stenosis by NASCET criteria at the carotid bifurcation. No dissection. Moderate stenosis at the left ECA origin. VERTEBRAL ARTERIES: Right vertebral artery is dominant. Mild atherosclerosis at the bilateral origins and right proximal V2 segment without flow-limiting stenosis or findings of dissection. No flow-limiting stenosis or dissection. AORTIC ARCH: Three-vessel aortic arch configuration. Mild atherosclerotic plaque about the visualized aortic arch and great vessel origins without flow-limiting stenosis. NONVASCULAR STRUCTURES: Advanced emphysema in the visualized upper lungs with right apical pleural parenchymal scar. No neck mass or lymphadenopathy. Thyroid gland is homogenous. Advanced multilevel degenerative changes throughout the visualized cervical  and thoracic spine. Postoperative changes of previous laminectomy at C3-C5. Grade 1 anterolisthesis at C4-C5 and C7-T1. Age-indeterminate compression fracture deformity at T5 with moderate vertebral body height loss. CT PERFUSION: PERFUSION MAPS: Scattered small areas of prolonged Tmax within differing vascular territories is favored artifactual with patient motion noted on the QC graphs. No convincing true focal perfusion defect. RAPID ANALYSIS: CBF<30%: 0 mL Tmax>6sec: 8 mL - suspected artifactual given distribution to differing vascular territories and lack of identifiable vascular lesion. Mismatch volume: 8 mL Mismatch ratio: \"Infinite\"     IMPRESSION: HEAD CT: 1.  Multiple hypodensities compatible with age-indeterminate lacunar infarct, including bilateral corona " radiata extending to centrum semiovale on the left, left putamen, and right cerebellum. Correlate with exam and prior head imaging, if available, and/or consider MRI to further assess. 2.  Deep gray nuclei are ill-defined, which may be related to technical factors and microvascular ischemic changes. A component of recent ischemia contributing to this appearance is not excluded. 3.  No acute intracranial hemorrhage. 4.  Features compatible with sequela from chronic ischemic microvascular change and diffuse cerebral volume loss. HEAD CTA: 1.  No proximal branch vessel occlusion, flow-limiting stenosis, aneurysm, or vascular malformation. Mild atherosclerosis about the carotid siphons and at the left V4 vertebral artery NECK CTA: 1.  Atherosclerosis at the carotid bifurcations including approximately 50% right cervical ICA stenosis. 2.  Mild atherosclerosis at the vertebral artery origins without flow-limiting stenosis. 3.  Age indeterminate T5 compression fracture with moderate height loss. Underlying advanced spondylosis and laminectomy defects at C3-C5. CT PERFUSION: 1.  No convincing perfusion defect allowing for patient motion artifact, as detailed. Nacho Peterson MD notified provider, VERONICA GONSALEZ, of preliminary CT head and CTA results via telephone at 9/27/2023 6:19 PM CDT, who acknowledge understanding.         Recent Labs   Lab Test 09/27/23  1758   *   POTASSIUM 4.4   CHLORIDE 94*   CO2 24   ANIONGAP 14   GLC 80   BUN 5.6*   CR 0.70   MINA 8.7*     CBC RESULTS:   Recent Labs   Lab Test 09/27/23  1758   WBC 5.8   RBC 3.60*   HGB 13.0*   HCT 36.5*   *   MCH 36.1*   MCHC 35.6   RDW 11.7         Liver Function Studies - No results for input(s): PROTTOTAL, ALBUMIN, BILITOTAL, ALKPHOS, AST, ALT, BILIDIRECT in the last 36238 hours.   No results found for: TROPI     Physical Exam  /87   Pulse 74   Temp 98  F (36.7  C)   Resp 17   Wt 72.9 kg (160 lb 11.5 oz)   SpO2 97%   BMI 24.80  "kg/m      Constitutional: Alert and oriented to person, place and time; no apparent distress.   HEENT: Normocephalic, no scleral icterus  Abdomen: soft, no distention/tenderness/guarding  Lungs: lungs clear to auscultation bilaterally  Heart: Regular s1s2, no evidence of murmurs  Extremities/Neuro:no LE edema. CN2-12 grossly intact. R  moderately reduced in strength. 4/5 strength in R Upper extremity and R lower extremity.  Speech is slow and patient careful in choosing his words, however patient states that is how he speaks at baseline \"slow and soft\"  Skin: No obvious signs of skin breakdown  Psychiatric: appropriate affect, insight and judgment  EKG: incomplete RBBB, LAD    I have personally spent 76 minutes total time today in preparing to see the patient (eg, review of tests), obtaining and/or reviewing separately obtained history, performing a medically appropriate examination and/or evaluation, counseling and educating the patient/family/caregiver, ordering medications, tests, or procedures, referring and communicating with other health care professionals, documenting clinical information in the electronic or other health record, independently interpreting results and communicating results to the patient/ family/caregiver and care coordination.    "

## 2023-09-28 NOTE — CONSULTS
Care Management Initial Consult    General Information  Assessment completed with: Patient,    Type of CM/SW Visit: Initial Assessment    Primary Care Provider verified and updated as needed: Yes   Readmission within the last 30 days: no previous admission in last 30 days      Reason for Consult: discharge planning  Advance Care Planning: Advance Care Planning Reviewed: no concerns identified          Communication Assessment  Patient's communication style: spoken language (English or Bilingual)    Hearing Difficulty or Deaf: no   Wear Glasses or Blind: no    Cognitive  Cognitive/Neuro/Behavioral: WDL  Level of Consciousness: alert  Arousal Level: opens eyes spontaneously  Orientation: oriented x 4  Mood/Behavior: calm, cooperative  Best Language: 0 - No aphasia  Speech: clear    Living Environment:   People in home: spouse (and 89 YO mother in law)     Current living Arrangements: house      Able to return to prior arrangements: yes       Family/Social Support:  Care provided by: self  Provides care for: no one  Marital Status:   Wife          Description of Support System: Supportive    Support Assessment: Adequate family and caregiver support    Current Resources:   Patient receiving home care services: No     Community Resources:    Equipment currently used at home: none  Supplies currently used at home:      Employment/Financial:  Employment Status: employed full-time     Employment/ Comments: [place]  Financial Concerns: No concerns identified           Does the patient's insurance plan have a 3 day qualifying hospital stay waiver?  No    Lifestyle & Psychosocial Needs:  Social Determinants of Health     Food Insecurity: Not on file   Depression: Not on file   Housing Stability: Not on file   Tobacco Use: Not on file   Financial Resource Strain: Not on file   Alcohol Use: Not on file   Transportation Needs: Not on file   Physical Activity: Not on file   Interpersonal Safety: Not on file    Stress: Not on file   Social Connections: Not on file       Functional Status:  Prior to admission patient needed assistance:   Dependent ADLs:: Independent  Dependent IADLs:: Independent       Mental Health Status:  Mental Health Status: No Current Concerns       Chemical Dependency Status:  Chemical Dependency Status: No Current Concerns             Values/Beliefs:  Spiritual, Cultural Beliefs, Synagogue Practices, Values that affect care:                 Additional Information:  CM consult acknowledged.  Met with patient.  He resides in a ranch style home with his wife and his 90 year old mother in law who stays in the lower level of the home.  He states his  wife helps her as needed.  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  He is currently unable to use his right arm effectively which is what brought him to the hospital.  He states he is having surgery at [facility] in near future and hoping this will help.  He stated his wife is able to help him if there are things he cannot do, i.e. dressing.  [facility]  [facility] PCP is Dr. Adkins at the [facility].  Reviewed PT recommendation for using the walker for mobility and OPPT.  He stated understanding.  OT consult pending.  At this time he denies any needs at discharge.    CM will continue to follow for discharge needs.    Nemo Jaimes RN, BSN, PHN  Inpatient Care Coordination  [facility]  Phone: [phone]

## 2023-09-29 ENCOUNTER — APPOINTMENT (OUTPATIENT)
Dept: OCCUPATIONAL THERAPY | Facility: CLINIC | Age: 73
DRG: 073 | End: 2023-09-29
Payer: COMMERCIAL

## 2023-09-29 ENCOUNTER — APPOINTMENT (OUTPATIENT)
Dept: PHYSICAL THERAPY | Facility: CLINIC | Age: 73
DRG: 073 | End: 2023-09-29
Payer: COMMERCIAL

## 2023-09-29 ENCOUNTER — APPOINTMENT (OUTPATIENT)
Dept: CARDIOLOGY | Facility: CLINIC | Age: 73
DRG: 073 | End: 2023-09-29
Attending: STUDENT IN AN ORGANIZED HEALTH CARE EDUCATION/TRAINING PROGRAM
Payer: COMMERCIAL

## 2023-09-29 VITALS
DIASTOLIC BLOOD PRESSURE: 81 MMHG | OXYGEN SATURATION: 98 % | HEART RATE: 64 BPM | WEIGHT: 160.72 LBS | RESPIRATION RATE: 16 BRPM | SYSTOLIC BLOOD PRESSURE: 145 MMHG | BODY MASS INDEX: 24.8 KG/M2 | TEMPERATURE: 98.1 F

## 2023-09-29 LAB
ANION GAP SERPL CALCULATED.3IONS-SCNC: 14 MMOL/L (ref 7–15)
BUN SERPL-MCNC: 11 MG/DL (ref 8–23)
CALCIUM SERPL-MCNC: 9.6 MG/DL (ref 8.8–10.2)
CHLORIDE SERPL-SCNC: 106 MMOL/L (ref 98–107)
CREAT SERPL-MCNC: 0.63 MG/DL (ref 0.67–1.17)
DEPRECATED HCO3 PLAS-SCNC: 21 MMOL/L (ref 22–29)
EGFRCR SERPLBLD CKD-EPI 2021: >90 ML/MIN/1.73M2
GLUCOSE SERPL-MCNC: 97 MG/DL (ref 70–99)
POTASSIUM SERPL-SCNC: 4.2 MMOL/L (ref 3.4–5.3)
SODIUM SERPL-SCNC: 141 MMOL/L (ref 135–145)

## 2023-09-29 PROCEDURE — 93246 EXT ECG>7D<15D RECORDING: CPT

## 2023-09-29 PROCEDURE — 97116 GAIT TRAINING THERAPY: CPT | Mod: GP

## 2023-09-29 PROCEDURE — 97530 THERAPEUTIC ACTIVITIES: CPT | Mod: GP

## 2023-09-29 PROCEDURE — 250N000013 HC RX MED GY IP 250 OP 250 PS 637: Performed by: STUDENT IN AN ORGANIZED HEALTH CARE EDUCATION/TRAINING PROGRAM

## 2023-09-29 PROCEDURE — 97112 NEUROMUSCULAR REEDUCATION: CPT | Mod: GO

## 2023-09-29 PROCEDURE — 99239 HOSP IP/OBS DSCHRG MGMT >30: CPT | Performed by: STUDENT IN AN ORGANIZED HEALTH CARE EDUCATION/TRAINING PROGRAM

## 2023-09-29 PROCEDURE — 36415 COLL VENOUS BLD VENIPUNCTURE: CPT | Performed by: STUDENT IN AN ORGANIZED HEALTH CARE EDUCATION/TRAINING PROGRAM

## 2023-09-29 PROCEDURE — 80048 BASIC METABOLIC PNL TOTAL CA: CPT | Performed by: STUDENT IN AN ORGANIZED HEALTH CARE EDUCATION/TRAINING PROGRAM

## 2023-09-29 PROCEDURE — 93248 EXT ECG>7D<15D REV&INTERPJ: CPT | Performed by: INTERNAL MEDICINE

## 2023-09-29 RX ORDER — ASPIRIN 81 MG/1
81 TABLET ORAL DAILY
Qty: 30 TABLET | Refills: 0 | Status: SHIPPED | OUTPATIENT
Start: 2023-09-30 | End: 2023-10-30

## 2023-09-29 RX ADMIN — ASPIRIN 81 MG: 81 TABLET, COATED ORAL at 08:02

## 2023-09-29 RX ADMIN — SERTRALINE HYDROCHLORIDE 150 MG: 50 TABLET ORAL at 08:02

## 2023-09-29 ASSESSMENT — ACTIVITIES OF DAILY LIVING (ADL)
ADLS_ACUITY_SCORE: 20

## 2023-09-29 NOTE — PROGRESS NOTES
Pt A&Ox4; can be forgetful at times. VSS on RA. Denies pain or any discomfort this shift. Neuros intact except for weak handgrip; numbness in RUE; baseline per pt. Ambulated to BR this shift; voiding well. SBA. PIV SL. Tele SR. Possible discharge home today. EMG in 2-3 wks as an outpatient.

## 2023-09-29 NOTE — PLAN OF CARE
Physical Therapy Discharge Summary    Reason for therapy discharge:    Discharged to home with outpatient therapy.    Progress towards therapy goal(s). See goals on Care Plan in [facility] electronic health record for goal details.  Goals met    Therapy recommendation(s):    Continued therapy is recommended.  Rationale/Recommendations:  outpatient PT for balance training due to history of frequent falls. Recommend patient use his 4WW for all mobility at this time.

## 2023-09-29 NOTE — PLAN OF CARE
Reason for Admission: RUE weakness, baseline numbness and tingling rt arm    Cognitive/Mentation: A/Ox 4, forgetful  Neuros/CMS: Intact ex RUE 4/5 with weak hand  + numbness  VS: stable on RA.   Tele: NSR.  GI: Last BM 9/28/23. Continent.  : Voiding frequently,hx of enlarged prostate. Continent.  Pulmonary: LS clear.  Pain: Denies any pain     Drains/Lines: Lt PIV is s/l  Skin: scattered scrapes/bruises on lower extremities  Activity: SBA with GBW.  Diet: Regular with thin liquids. Takes pills whole with water.     Therapies recs: PT recommends home with out patient PT, OT consult pending  Discharge: pending    Aggression Stoplight Tool: green    End of shift summary: General neurology recommend outpatient EMG in 2-3wks

## 2023-09-30 NOTE — PLAN OF CARE
Occupational Therapy Discharge Summary    Reason for therapy discharge:    Discharged to home with outpatient therapy.    Progress towards therapy goal(s). See goals on Care Plan in [facility] electronic health record for goal details.  Goals met  Goals partially met.  Barriers to achieving goals:   discharge from facility.    Therapy recommendation(s):      Continued therapy is recommended.  Rationale/Recommendations:   .OT Rationale for DC Rec: Pt has supportive family and good home setting.  Appears to place many demands on himself to complete his work.  Would benefit from skilled OT to support increased function in dominant right hand and use of one handed techniques and adaptions for home.  Pt is motivated for rehab.

## 2023-10-02 NOTE — DISCHARGE SUMMARY
"[facility]  Hospitalist Discharge Summary      Date of Admission:  9/27/2023  Date of Discharge:  9/29/2023 12:05 PM  Discharging Provider: Yola Haney DO  Discharge Service: Hospitalist Service    Discharge Diagnoses   See below    Clinically Significant Risk Factors          Follow-ups Needed After Discharge   Follow-up Appointments     Follow-up and recommended labs and tests       Follow up with primary care provider, [facility], within   7-14 days and with neurology in clinic            Discharge Disposition   Discharged to home  Condition at discharge: Stable    Hospital Course   73M hx of HTN, emphasema, depression and R ulnar neuropathy presenting with acute RUE weakness. Found to have age indeterminate lacunar infarcts and suspected radial nerve palsy. He was also found to have chronic cervical spine changes with stenosis. Neurosurgery and neurology were consulted and patient will folllow up with his primary neurosurgeon to discuss already planned surgery and follow up with neurology for EMG in a few weeks and to further discuss chronic CVAs. On discharge palsy still present with decrease gripe.    See details below     Acute on chronic RUE weakness  Multiple cervical levels of foraminal stenosis, moderate to severe with moderate canal stenosis and cord flattening at C7-T1  T5 compression fracture  Patient presented with RUE weakness 4hrs prior to presentation upon waking up from a nap. Has hx of Ulnar neuropathy on the same side but stated that weakness was new. Ongoing outpatient neurosurgery work up for known cervical spine stenosis through \"[facility]\"  *CTA/perfusion Head: Mild atherosclerosis about the carotid siphons and at the left V4 vertebral artery. No convincing perfusion defect allowing for patient motion artifact   *CTA neck: Atherosclerosis at the carotid bifurcations including approximately 50% right cervical ICA stenosis. Mild atherosclerosis at " "the vertebral artery origins without flow-limiting stenosis  *Head CT: Multiple hypodensities compatible with age-indeterminate lacunar infarct, including bilateral corona radiata extending to centrum semiovale on the left, left putamen, and right cerebellum.   *MRI Brain/C Spine: No acute intracranial process. Generalized brain atrophy and presumed microvascular ischemic changes. 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 Few levels of cord flattening without high-grade canal stenosis. Multilevel foraminal stenoses, as above.      - B12 normal folate pending  - RUE weakness and clumsiness persistent today with decreased  strength. Obvious muscle atrophy noted to R hand.     Age indeterminate lacunar infarcts  Suspect weakness is due to his cervical stenosis however on work up incidental lacunar infarcts identified  - discharge with heart monitor to check for Afib and start 81mg asa  - further follow up with neurology in clinic     Essential HTN  CAD  Nicotine use disorder  EMR states patient had evidence of atherosclerosis on a Low dose Chest CT pending \"CV risk management\"  -150s on presentation, remaining vitals wnl  - resume home meds on discharge and encourage tobacco cessation     Pulmonary Emphysema  Solitary Nodule of Lung  Hx of pET [facility] that was [facility]  Hx of Emphesema based only on imaging and not symptoms  --Duonebs prn     DELONTE  doesn't use CPAP     Depression  on setraline (likely 100mg)  --continue sertraline     HypoNatremia  Mild Macrocytic Anemia  `Na 132, Hb 13,   --normal labs     Spondylosis with Radiculopathy  Chronic Back pain  Right Ulnar Neuropathy   Patient states he's pending \"vertebral fusion' procedure  - care as stated above     Alcohol use  BAL positive on admission  - encouraged to cut back       Consultations This Hospital Stay   CARE MANAGEMENT / SOCIAL WORK IP CONSULT  SMOKING CESSATION PROGRAM IP " CONSULT  NEUROLOGY IP CONSULT  PHYSICAL THERAPY ADULT IP CONSULT  OCCUPATIONAL THERAPY ADULT IP CONSULT  NEUROSURGERY IP CONSULT  NEUROLOGY IP STROKE CONSULT    Code Status   Prior    Time Spent on this Encounter   IYola DO, personally saw the patient today and spent greater than 30 minutes discharging this patient.       Yola Haney DO  [facility] NEUROSCIENCE UNIT  [address] [place] [address]  Phone: [phone]  ______________________________________________________________________    Physical Exam   Vital Signs:                   Weight: 160 lbs 11.45 oz    Primary Care Physician   [facility]    Discharge Orders      Physical Therapy Referral      Occupational Therapy Referral      Follow-up and recommended labs and tests     Follow up with primary care provider, [facility], within 7-14 days and with neurology in clinic     Activity    Your activity upon discharge: activity as tolerated     Reason for your hospital stay    You presented for arm weakness likely due to something called a nerve palsy. You need to follow up in neurology clinic for further work up on this and continue to work with rehab. You also should continue to follow up with your neurosurgery team to discuss your possible cervical surgery in the future.    Imaging here also showed possible old very small strokes that have occurred in the past. You should continue to aggressively modify your risk factors for stroke which means keeping your BP low and STOP smoking. You will discharge with as heart monitor as well to follow for any abnormal heart beat that may increase your risk of stroke     Diet    Follow this diet upon discharge: Orders Placed This Encounter      Regular Diet Adult       Significant Results and Procedures   Results for orders placed or performed during the hospital encounter of 09/27/23   CT Head w/o Contrast    Narrative    EXAM: CT HEAD W/O  CONTRAST, CTA HEAD NECK W CONTRAST, CT HEAD PERFUSION W CONTRAST  LOCATION: [facility]  DATE/TIME: 9/27/2023 6:09 PM CDT    INDICATION: Code Stroke to evaluate for potential thrombolysis and thrombectomy. PLEASE READ IMMEDIATELY.  COMPARISON: None.  CONTRAST: 75 mL Isovue 370 (accession [id number]), 50mL Isovue 370 (accession [id number]), 75 mL Isovue 370 (accession [id number])  TECHNIQUE: Head and neck CT angiogram with IV contrast. Noncontrast head CT followed by axial helical CT images of the head and neck vessels obtained during the arterial phase of intravenous contrast administration. Axial 2D reconstructed images and   multiplanar 3D MIP reconstructed images of the head and neck vessels were performed by the technologist. Additional CT cerebral perfusion was performed utilizing a second contrast bolus. Perfusion data were post processed with generation of standard   perfusion maps and estimation of ischemic/infarcted volumes utilizing standard threshold values. Dose reduction techniques were used. All stenosis measurements made according to NASCET criteria unless otherwise specified.    FINDINGS:    NONCONTRAST HEAD CT:   INTRACRANIAL CONTENTS: Hypodensities in the bilateral corona radiata, left posterior putamen, and right cerebellum compatible with age-indeterminate lacunar infarcts. Ill-defined hypoattenuation in the left centrum semiovale. Cortical gray-white matter   differentiation is relatively maintained. Somewhat ill-defined deep gray-white differentiation may be related to technical factors and/or sequela from chronic ischemic microvascular change. No hemorrhage or extraaxial collection. No mass effect.   Subarachnoid cisterns are patent. Patchy white matter hypodensities are, while nonspecific, most compatible with chronic microvascular ischemic changes. Mild generalized volume loss. No hydrocephalus.    VISUALIZED ORBITS/SINUSES/MASTOIDS: No visible intraorbital abnormality.  Paranasal sinuses are clear. No middle ear or mastoid effusion.    BONES/SOFT TISSUES: No acute abnormality.    HEAD CTA:  ANTERIOR CIRCULATION: No proximal branch vessel occlusion. Atherosclerotic calcification scattered about the bilateral carotid siphons with mild bilateral paraclinoid ICA narrowing. No flow-limiting stenosis. No aneurysm or high-flow vascular   malformation.    POSTERIOR CIRCULATION: No proximal branch vessel occlusion. Mild focal atherosclerotic calcification at the V4 segment of the left vertebral artery. No aneurysm or high-flow vascular malformation. Hypoplastic caliber left vertebral artery with dominant   right vertebrobasilar supply.    DURAL VENOUS SINUSES: Not well evaluated on a technical basis.    NECK CTA:  RIGHT CAROTID: Calcified atherosclerotic plaque with approximately 50% cervical ICA stenosis by NASCET criteria at the carotid bifurcation. No dissection.    LEFT CAROTID: Mild atherosclerosis without hemodynamically significant stenosis by NASCET criteria at the carotid bifurcation. No dissection. Moderate stenosis at the left ECA origin.    VERTEBRAL ARTERIES: Right vertebral artery is dominant. Mild atherosclerosis at the bilateral origins and right proximal V2 segment without flow-limiting stenosis or findings of dissection. No flow-limiting stenosis or dissection.    AORTIC ARCH: Three-vessel aortic arch configuration. Mild atherosclerotic plaque about the visualized aortic arch and great vessel origins without flow-limiting stenosis.    NONVASCULAR STRUCTURES: Advanced emphysema in the visualized upper lungs with right apical pleural parenchymal scar. No neck mass or lymphadenopathy. Thyroid gland is homogenous. Advanced multilevel degenerative changes throughout the visualized cervical   and thoracic spine. Postoperative changes of previous laminectomy at C3-C5. Grade 1 anterolisthesis at C4-C5 and C7-T1. Age-indeterminate compression fracture deformity at T5 with moderate  "vertebral body height loss.    CT PERFUSION:  PERFUSION MAPS: Scattered small areas of prolonged Tmax within differing vascular territories is favored artifactual with patient motion noted on the QC graphs. No convincing true focal perfusion defect.    RAPID ANALYSIS:  CBF<30%: 0 mL  Tmax>6sec: 8 mL - suspected artifactual given distribution to differing vascular territories and lack of identifiable vascular lesion.  Mismatch volume: 8 mL  Mismatch ratio: \"Infinite\"      Impression    IMPRESSION:   HEAD CT:  1.  Multiple hypodensities compatible with age-indeterminate lacunar infarct, including bilateral corona radiata extending to centrum semiovale on the left, left putamen, and right cerebellum. Correlate with exam and prior head imaging, if available,   and/or consider MRI to further assess.  2.  Deep gray nuclei are ill-defined, which may be related to technical factors and microvascular ischemic changes. A component of recent ischemia contributing to this appearance is not excluded.  3.  No acute intracranial hemorrhage.  4.  Features compatible with sequela from chronic ischemic microvascular change and diffuse cerebral volume loss.    HEAD CTA:  1.  No proximal branch vessel occlusion, flow-limiting stenosis, aneurysm, or vascular malformation. Mild atherosclerosis about the carotid siphons and at the left V4 vertebral artery    NECK CTA:  1.  Atherosclerosis at the carotid bifurcations including approximately 50% right cervical ICA stenosis.  2.  Mild atherosclerosis at the vertebral artery origins without flow-limiting stenosis.   3.  Age indeterminate T5 compression fracture with moderate height loss. Underlying advanced spondylosis and laminectomy defects at C3-C5.    CT PERFUSION:  1.  No convincing perfusion defect allowing for patient motion artifact, as detailed.    Nacho Peterson MD notified provider, VERONICA GONSALEZ, of preliminary CT head and CTA results via telephone at 9/27/2023 6:19 PM CDT, who " acknowledge understanding.   CTA Head Neck with Contrast    Narrative    EXAM: CT HEAD W/O CONTRAST, CTA HEAD NECK W CONTRAST, CT HEAD PERFUSION W CONTRAST  LOCATION: [facility]  DATE/TIME: 9/27/2023 6:09 PM CDT    INDICATION: Code Stroke to evaluate for potential thrombolysis and thrombectomy. PLEASE READ IMMEDIATELY.  COMPARISON: None.  CONTRAST: 75 mL Isovue 370 (accession [id number]), 50mL Isovue 370 (accession [id number]), 75 mL Isovue 370 (accession [id number])  TECHNIQUE: Head and neck CT angiogram with IV contrast. Noncontrast head CT followed by axial helical CT images of the head and neck vessels obtained during the arterial phase of intravenous contrast administration. Axial 2D reconstructed images and   multiplanar 3D MIP reconstructed images of the head and neck vessels were performed by the technologist. Additional CT cerebral perfusion was performed utilizing a second contrast bolus. Perfusion data were post processed with generation of standard   perfusion maps and estimation of ischemic/infarcted volumes utilizing standard threshold values. Dose reduction techniques were used. All stenosis measurements made according to NASCET criteria unless otherwise specified.    FINDINGS:    NONCONTRAST HEAD CT:   INTRACRANIAL CONTENTS: Hypodensities in the bilateral corona radiata, left posterior putamen, and right cerebellum compatible with age-indeterminate lacunar infarcts. Ill-defined hypoattenuation in the left centrum semiovale. Cortical gray-white matter   differentiation is relatively maintained. Somewhat ill-defined deep gray-white differentiation may be related to technical factors and/or sequela from chronic ischemic microvascular change. No hemorrhage or extraaxial collection. No mass effect.   Subarachnoid cisterns are patent. Patchy white matter hypodensities are, while nonspecific, most compatible with chronic microvascular ischemic changes. Mild generalized volume loss. No  hydrocephalus.    VISUALIZED ORBITS/SINUSES/MASTOIDS: No visible intraorbital abnormality. Paranasal sinuses are clear. No middle ear or mastoid effusion.    BONES/SOFT TISSUES: No acute abnormality.    HEAD CTA:  ANTERIOR CIRCULATION: No proximal branch vessel occlusion. Atherosclerotic calcification scattered about the bilateral carotid siphons with mild bilateral paraclinoid ICA narrowing. No flow-limiting stenosis. No aneurysm or high-flow vascular   malformation.    POSTERIOR CIRCULATION: No proximal branch vessel occlusion. Mild focal atherosclerotic calcification at the V4 segment of the left vertebral artery. No aneurysm or high-flow vascular malformation. Hypoplastic caliber left vertebral artery with dominant   right vertebrobasilar supply.    DURAL VENOUS SINUSES: Not well evaluated on a technical basis.    NECK CTA:  RIGHT CAROTID: Calcified atherosclerotic plaque with approximately 50% cervical ICA stenosis by NASCET criteria at the carotid bifurcation. No dissection.    LEFT CAROTID: Mild atherosclerosis without hemodynamically significant stenosis by NASCET criteria at the carotid bifurcation. No dissection. Moderate stenosis at the left ECA origin.    VERTEBRAL ARTERIES: Right vertebral artery is dominant. Mild atherosclerosis at the bilateral origins and right proximal V2 segment without flow-limiting stenosis or findings of dissection. No flow-limiting stenosis or dissection.    AORTIC ARCH: Three-vessel aortic arch configuration. Mild atherosclerotic plaque about the visualized aortic arch and great vessel origins without flow-limiting stenosis.    NONVASCULAR STRUCTURES: Advanced emphysema in the visualized upper lungs with right apical pleural parenchymal scar. No neck mass or lymphadenopathy. Thyroid gland is homogenous. Advanced multilevel degenerative changes throughout the visualized cervical   and thoracic spine. Postoperative changes of previous laminectomy at C3-C5. Grade 1  "anterolisthesis at C4-C5 and C7-T1. Age-indeterminate compression fracture deformity at T5 with moderate vertebral body height loss.    CT PERFUSION:  PERFUSION MAPS: Scattered small areas of prolonged Tmax within differing vascular territories is favored artifactual with patient motion noted on the QC graphs. No convincing true focal perfusion defect.    RAPID ANALYSIS:  CBF<30%: 0 mL  Tmax>6sec: 8 mL - suspected artifactual given distribution to differing vascular territories and lack of identifiable vascular lesion.  Mismatch volume: 8 mL  Mismatch ratio: \"Infinite\"      Impression    IMPRESSION:   HEAD CT:  1.  Multiple hypodensities compatible with age-indeterminate lacunar infarct, including bilateral corona radiata extending to centrum semiovale on the left, left putamen, and right cerebellum. Correlate with exam and prior head imaging, if available,   and/or consider MRI to further assess.  2.  Deep gray nuclei are ill-defined, which may be related to technical factors and microvascular ischemic changes. A component of recent ischemia contributing to this appearance is not excluded.  3.  No acute intracranial hemorrhage.  4.  Features compatible with sequela from chronic ischemic microvascular change and diffuse cerebral volume loss.    HEAD CTA:  1.  No proximal branch vessel occlusion, flow-limiting stenosis, aneurysm, or vascular malformation. Mild atherosclerosis about the carotid siphons and at the left V4 vertebral artery    NECK CTA:  1.  Atherosclerosis at the carotid bifurcations including approximately 50% right cervical ICA stenosis.  2.  Mild atherosclerosis at the vertebral artery origins without flow-limiting stenosis.   3.  Age indeterminate T5 compression fracture with moderate height loss. Underlying advanced spondylosis and laminectomy defects at C3-C5.    CT PERFUSION:  1.  No convincing perfusion defect allowing for patient motion artifact, as detailed.    Nacho Peterson MD notified " provider, VERONICA GONSALEZ, of preliminary CT head and CTA results via telephone at 9/27/2023 6:19 PM CDT, who acknowledge understanding.   CT Head Perfusion w Contrast - For Tier 2 Stroke    Narrative    EXAM: CT HEAD W/O CONTRAST, CTA HEAD NECK W CONTRAST, CT HEAD PERFUSION W CONTRAST  LOCATION: [facility]  DATE/TIME: 9/27/2023 6:09 PM CDT    INDICATION: Code Stroke to evaluate for potential thrombolysis and thrombectomy. PLEASE READ IMMEDIATELY.  COMPARISON: None.  CONTRAST: 75 mL Isovue 370 (accession [id number]), 50mL Isovue 370 (accession [id number]), 75 mL Isovue 370 (accession [id number])  TECHNIQUE: Head and neck CT angiogram with IV contrast. Noncontrast head CT followed by axial helical CT images of the head and neck vessels obtained during the arterial phase of intravenous contrast administration. Axial 2D reconstructed images and   multiplanar 3D MIP reconstructed images of the head and neck vessels were performed by the technologist. Additional CT cerebral perfusion was performed utilizing a second contrast bolus. Perfusion data were post processed with generation of standard   perfusion maps and estimation of ischemic/infarcted volumes utilizing standard threshold values. Dose reduction techniques were used. All stenosis measurements made according to NASCET criteria unless otherwise specified.    FINDINGS:    NONCONTRAST HEAD CT:   INTRACRANIAL CONTENTS: Hypodensities in the bilateral corona radiata, left posterior putamen, and right cerebellum compatible with age-indeterminate lacunar infarcts. Ill-defined hypoattenuation in the left centrum semiovale. Cortical gray-white matter   differentiation is relatively maintained. Somewhat ill-defined deep gray-white differentiation may be related to technical factors and/or sequela from chronic ischemic microvascular change. No hemorrhage or extraaxial collection. No mass effect.   Subarachnoid cisterns are patent. Patchy white matter  hypodensities are, while nonspecific, most compatible with chronic microvascular ischemic changes. Mild generalized volume loss. No hydrocephalus.    VISUALIZED ORBITS/SINUSES/MASTOIDS: No visible intraorbital abnormality. Paranasal sinuses are clear. No middle ear or mastoid effusion.    BONES/SOFT TISSUES: No acute abnormality.    HEAD CTA:  ANTERIOR CIRCULATION: No proximal branch vessel occlusion. Atherosclerotic calcification scattered about the bilateral carotid siphons with mild bilateral paraclinoid ICA narrowing. No flow-limiting stenosis. No aneurysm or high-flow vascular   malformation.    POSTERIOR CIRCULATION: No proximal branch vessel occlusion. Mild focal atherosclerotic calcification at the V4 segment of the left vertebral artery. No aneurysm or high-flow vascular malformation. Hypoplastic caliber left vertebral artery with dominant   right vertebrobasilar supply.    DURAL VENOUS SINUSES: Not well evaluated on a technical basis.    NECK CTA:  RIGHT CAROTID: Calcified atherosclerotic plaque with approximately 50% cervical ICA stenosis by NASCET criteria at the carotid bifurcation. No dissection.    LEFT CAROTID: Mild atherosclerosis without hemodynamically significant stenosis by NASCET criteria at the carotid bifurcation. No dissection. Moderate stenosis at the left ECA origin.    VERTEBRAL ARTERIES: Right vertebral artery is dominant. Mild atherosclerosis at the bilateral origins and right proximal V2 segment without flow-limiting stenosis or findings of dissection. No flow-limiting stenosis or dissection.    AORTIC ARCH: Three-vessel aortic arch configuration. Mild atherosclerotic plaque about the visualized aortic arch and great vessel origins without flow-limiting stenosis.    NONVASCULAR STRUCTURES: Advanced emphysema in the visualized upper lungs with right apical pleural parenchymal scar. No neck mass or lymphadenopathy. Thyroid gland is homogenous. Advanced multilevel degenerative changes  "throughout the visualized cervical   and thoracic spine. Postoperative changes of previous laminectomy at C3-C5. Grade 1 anterolisthesis at C4-C5 and C7-T1. Age-indeterminate compression fracture deformity at T5 with moderate vertebral body height loss.    CT PERFUSION:  PERFUSION MAPS: Scattered small areas of prolonged Tmax within differing vascular territories is favored artifactual with patient motion noted on the QC graphs. No convincing true focal perfusion defect.    RAPID ANALYSIS:  CBF<30%: 0 mL  Tmax>6sec: 8 mL - suspected artifactual given distribution to differing vascular territories and lack of identifiable vascular lesion.  Mismatch volume: 8 mL  Mismatch ratio: \"Infinite\"      Impression    IMPRESSION:   HEAD CT:  1.  Multiple hypodensities compatible with age-indeterminate lacunar infarct, including bilateral corona radiata extending to centrum semiovale on the left, left putamen, and right cerebellum. Correlate with exam and prior head imaging, if available,   and/or consider MRI to further assess.  2.  Deep gray nuclei are ill-defined, which may be related to technical factors and microvascular ischemic changes. A component of recent ischemia contributing to this appearance is not excluded.  3.  No acute intracranial hemorrhage.  4.  Features compatible with sequela from chronic ischemic microvascular change and diffuse cerebral volume loss.    HEAD CTA:  1.  No proximal branch vessel occlusion, flow-limiting stenosis, aneurysm, or vascular malformation. Mild atherosclerosis about the carotid siphons and at the left V4 vertebral artery    NECK CTA:  1.  Atherosclerosis at the carotid bifurcations including approximately 50% right cervical ICA stenosis.  2.  Mild atherosclerosis at the vertebral artery origins without flow-limiting stenosis.   3.  Age indeterminate T5 compression fracture with moderate height loss. Underlying advanced spondylosis and laminectomy defects at C3-C5.    CT " PERFUSION:  1.  No convincing perfusion defect allowing for patient motion artifact, as detailed.    Nacho Peterson MD notified provider, VERONICA GONSALEZ, of preliminary CT head and CTA results via telephone at 9/27/2023 6:19 PM CDT, who acknowledge understanding.   MR Brain w/o & w Contrast    Narrative    EXAM: MR BRAIN W/O and W CONTRAST  LOCATION: [facility]  DATE: 9/27/2023    INDICATION:  Right upper extremity weakness  COMPARISON: None.  CONTRAST: 7 mL Gadavist  TECHNIQUE: Routine multiplanar multisequence head MRI without and with intravenous contrast.    FINDINGS:  INTRACRANIAL CONTENTS: No acute or subacute infarct. No mass, acute hemorrhage, or extra-axial fluid collections. Patchy nonspecific T2/FLAIR hyperintensities within the cerebral white matter most consistent with mild to moderate chronic microvascular   ischemic change. 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 Mild generalized cerebral atrophy. No hydrocephalus. Normal position of the cerebellar tonsils. No pathologic contrast enhancement.    SELLA: No abnormality accounting for technique.    OSSEOUS STRUCTURES/SOFT TISSUES: Normal marrow signal. The major intracranial vascular flow voids are maintained.     ORBITS: No abnormality accounting for technique.     SINUSES/MASTOIDS: No paranasal sinus mucosal disease. No middle ear or mastoid effusion.       Impression    IMPRESSION:  1.  No acute intracranial process.  2.  Generalized brain atrophy and presumed microvascular ischemic changes as detailed above.   MR Cervical Spine w/o & w Contrast    Narrative    EXAM: MR CERVICAL SPINE W/O and W CONTRAST  LOCATION: [facility]  DATE: 9/27/2023    INDICATION:  Right upper extremity weakness  COMPARISON:  Same day CTA neck.  CONTRAST: 7 mL Gadavist  TECHNIQUE: MRI Cervical Spine without and with IV contrast.    FINDINGS:   Vertebral body  heights maintained. Minimal degenerative anterolisthesis of C4 on C5 and C7 on T1. C3-C5 laminectomies. Few levels of degenerative endplate edema and fatty marrow changes.  Otherwise, no suspicious marrow signal.  Focal cord signal   abnormality with cord thinning at C3-C4. No abnormal enhancement.      Postoperative changes of the dorsal paraspinal soft tissues. No prevertebral edema.    Craniovertebral junction and C1-C2: Widely patent.    C2-C3: Unremarkable appearance of the disc. Mild bilateral facet arthropathy. No significant canal or foraminal stenosis.     C3-C4: Broad-based disc and osteophyte. Severe right and mild left facet arthropathy with ligamentum flavum hypertrophy. There is narrowing of the thecal sac with cord flattening in the transverse dimension. Severe bilateral foraminal stenosis, right   greater than left.     C4-C5: Small broad-based disc and osteophyte. Severe bilateral facet arthropathy. No significant canal stenosis. Moderate right and severe left foraminal stenosis.     C5-C6: Broad-based disc and osteophyte. Severe left and moderate right facet arthropathy. No significant canal stenosis. Mild bilateral foraminal stenosis.     C6-C7: Broad-based disc and osteophyte with ventral cord flattening. Mild to moderate bilateral facet arthropathy. Mild canal stenosis. Severe left and mild right foraminal stenosis.     C7-T1: Anterolisthesis with uncovering of the disc. Severe bilateral facet arthropathy. Mild to moderate canal stenosis with ventral cord flattening. Severe bilateral foraminal stenosis.      Impression    IMPRESSION:  1.  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  2.  Few levels of cord flattening without high-grade canal stenosis. Multilevel foraminal stenoses, as above.   Echocardiogram Complete     Value    LVEF  55-60%    Narrative    [id number]  [id number]  [id number]  [id number]^SAADEDROMERO^[facility]  [facility]  Echocardiography Laboratory  [address] [place] [address]     Name: FRANEK, DUANE M  MRN: [id number]  : 1950  Study Date: 2023 07:41 AM  Age: 73 yrs  Gender: Male  Patient [facility]: [facility]  Reason For Study: TIA  Ordering Physician: HEATHER GUZMAN  Referring Physician: [facility]  Performed By: Zayra Hopkins     BSA: 1.8 m2  Height: 67 in  Weight: 160 lb  HR: 69  BP: 131/87 mmHg  ______________________________________________________________________________  Procedure  Complete Portable Echo Adult. Optison (NDC #4759-4004) given intravenously.  ______________________________________________________________________________  Interpretation Summary     1. The left ventricle is mildly dilated. There is normal left ventricular wall  thickness. Left ventricular systolic function is normal. The visual ejection  fraction is 55-60%. Diastolic Doppler findings (E/E' ratio and/or other  parameters) suggest left ventricular filling pressures are indeterminate. No  regional wall motion abnormalities noted. There is no thrombus seen in the  left ventricle.  2. The right ventricle is normal size. The right ventricular systolic function  is normal.  3. Trace to mild mitral and tricuspid regurgitation.  4. No pericardial effusion.  5. No previous study for comparison.  ______________________________________________________________________________  Left Ventricle  The left ventricle is mildly dilated. There is normal left ventricular wall  thickness. Left ventricular systolic function is normal. The visual ejection  fraction is 55-60%. Diastolic Doppler findings (E/E' ratio and/or other  parameters) suggest left ventricular filling pressures are indeterminate. No  regional wall motion abnormalities noted. There is no thrombus seen in the  left ventricle.     Right Ventricle  The right ventricle is normal size. The right ventricular systolic function is  normal.      Atria  Normal left atrial size. Right atrial size is normal. There is no color  Doppler evidence of an atrial shunt.     Mitral Valve  There is mild mitral annular calcification. There is mild (1+) mitral  regurgitation.     Tricuspid Valve  There is trace tricuspid regurgitation. Right ventricular systolic pressure  could not be approximated due to inadequate tricuspid regurgitation.     Aortic Valve  There is mild trileaflet aortic sclerosis. No aortic regurgitation is present.  No aortic stenosis is present.     Pulmonic Valve  There is no pulmonic valvular stenosis.     Vessels  The aortic root is normal size. Normal size ascending aorta. The inferior vena  cava is normal.     Pericardium  There is no pericardial effusion.     Rhythm  Sinus rhythm was noted.  ______________________________________________________________________________  MMode/2D Measurements & Calculations  IVSd: 0.87 cm     LVIDd: 5.9 cm  LVIDs: 3.2 cm  LVPWd: 0.84 cm  FS: 46.6 %  LV mass(C)d: 197.1 grams  LV mass(C)dI: 107.2 grams/m2  Ao root diam: 3.5 cm  LA dimension: 3.0 cm  asc Aorta Diam: 3.4 cm  LA/Ao: 0.86  LVOT diam: 2.2 cm  LVOT area: 3.8 cm2  Ao root diam index Ht(cm/m): 2.1  Ao root diam index BSA (cm/m2): 1.9  Asc Ao diam index BSA (cm/m2): 1.8  Asc Ao diam index Ht(cm/m): 2.0  RV Base: 3.6 cm  RWT: 0.28  TAPSE: 3.3 cm     Doppler Measurements & Calculations  MV E max cam: 105.2 cm/sec  MV A max cam: 127.3 cm/sec  MV E/A: 0.83     MV dec time: 0.19 sec  PA acc time: 0.10 sec  E/E' av.9  Lateral E/e': 8.0  Medial E/e': 11.7  RV S Cam: 14.8 cm/sec     ______________________________________________________________________________  Report approved by: Alvaro Gipson 2023 11:32 AM             Discharge Medications   Discharge Medication List as of 2023 10:53 AM        START taking these medications    Details   aspirin 81 MG EC tablet Take 1 tablet (81 mg) by mouth daily for 30 days, Disp-30 tablet, R-0, E-Prescribe            CONTINUE these medications which have NOT CHANGED    Details   amLODIPine (NORVASC) 10 MG tablet Take 10 mg by mouth daily, Historical      Flaxseed, Linseed, (FLAX SEED OIL) 1000 MG capsule Take 1 capsule by mouth daily, Historical      ibuprofen (ADVIL/MOTRIN) 200 MG tablet Take 400-600 mg by mouth 2 times daily as needed for pain, Historical      lisinopril (ZESTRIL) 10 MG tablet Take 10 mg by mouth daily, Historical      multivitamin, therapeutic (THERA-VIT) TABS tablet Take 1 tablet by mouth daily, Historical      sertraline (ZOLOFT) 100 MG tablet Take 150 mg by mouth daily, Historical      vitamin B-12 (CYANOCOBALAMIN) 100 MCG tablet Take 100 mcg by mouth daily, Historical      albuterol (PROAIR HFA/PROVENTIL HFA/VENTOLIN HFA) 108 (90 Base) MCG/ACT inhaler Inhale 2 puffs into the lungs 4 times daily as needed for shortness of breath, Historical           Allergies   Allergies   Allergen Reactions    Iodine Hives     Per patient at 14- OV, he can ingest seafood if he takes an antihistamine before meal    Varenicline Anxiety    Iodinated Contrast Media Hives

## 2024-03-03 ENCOUNTER — HOSPITAL ENCOUNTER (EMERGENCY)
Facility: CLINIC | Age: 74
Discharge: HOME | End: 2024-03-04
Attending: EMERGENCY MEDICINE | Admitting: EMERGENCY MEDICINE
Payer: COMMERCIAL

## 2024-03-03 ENCOUNTER — APPOINTMENT (OUTPATIENT)
Dept: CT IMAGING | Facility: CLINIC | Age: 74
End: 2024-03-03
Attending: EMERGENCY MEDICINE
Payer: COMMERCIAL

## 2024-03-03 DIAGNOSIS — S00.81XA FACIAL ABRASION, INITIAL ENCOUNTER: ICD-10-CM

## 2024-03-03 DIAGNOSIS — S01.21XA LACERATION OF NOSE, INITIAL ENCOUNTER: ICD-10-CM

## 2024-03-03 PROCEDURE — 70486 CT MAXILLOFACIAL W/O DYE: CPT

## 2024-03-03 PROCEDURE — 99284 EMERGENCY DEPT VISIT MOD MDM: CPT | Mod: 25

## 2024-03-03 PROCEDURE — 12011 RPR F/E/E/N/L/M 2.5 CM/<: CPT

## 2024-03-03 RX ORDER — LIDOCAINE HYDROCHLORIDE AND EPINEPHRINE 10; 10 MG/ML; UG/ML
INJECTION, SOLUTION INFILTRATION; PERINEURAL
Status: COMPLETED
Start: 2024-03-03 | End: 2024-03-04

## 2024-03-03 ASSESSMENT — ACTIVITIES OF DAILY LIVING (ADL): ADLS_ACUITY_SCORE: 38

## 2024-03-03 ASSESSMENT — COLUMBIA-SUICIDE SEVERITY RATING SCALE - C-SSRS
2. HAVE YOU ACTUALLY HAD ANY THOUGHTS OF KILLING YOURSELF IN THE PAST MONTH?: NO
1. IN THE PAST MONTH, HAVE YOU WISHED YOU WERE DEAD OR WISHED YOU COULD GO TO SLEEP AND NOT WAKE UP?: NO
6. HAVE YOU EVER DONE ANYTHING, STARTED TO DO ANYTHING, OR PREPARED TO DO ANYTHING TO END YOUR LIFE?: NO

## 2024-03-04 VITALS
RESPIRATION RATE: 16 BRPM | SYSTOLIC BLOOD PRESSURE: 119 MMHG | DIASTOLIC BLOOD PRESSURE: 78 MMHG | OXYGEN SATURATION: 97 % | TEMPERATURE: 97.1 F | HEART RATE: 58 BPM

## 2024-03-04 PROCEDURE — 250N000009 HC RX 250

## 2024-03-04 RX ADMIN — LIDOCAINE HYDROCHLORIDE,EPINEPHRINE BITARTRATE: 10; .01 INJECTION, SOLUTION INFILTRATION; PERINEURAL at 00:23

## 2024-03-04 NOTE — ED PROVIDER NOTES
History     Chief Complaint:  Fall and Facial Injury       HPI   Duane M Franek is a 73 year old male who presents emergency department with facial trauma and nose laceration.  Patient was taking recycling and garbage bins out to the curb when the wind blew the lid of the recycling bin which struck him in the face.  He denies any loss of consciousness.  Denies any additional injuries.  Patient suffered a significant laceration to the bridge of his nose which was persistently bleeding prior to coming to the emergency department.  He denies any nausea, vomiting or vision changes.  Denies any neck pain.  Denies any dental trauma.    Medications:    albuterol (PROAIR HFA/PROVENTIL HFA/VENTOLIN HFA) 108 (90 Base) MCG/ACT inhaler  amLODIPine (NORVASC) 10 MG tablet  Flaxseed, Linseed, (FLAX SEED OIL) 1000 MG capsule  ibuprofen (ADVIL/MOTRIN) 200 MG tablet  lisinopril (ZESTRIL) 10 MG tablet  multivitamin, therapeutic (THERA-VIT) TABS tablet  sertraline (ZOLOFT) 100 MG tablet  vitamin B-12 (CYANOCOBALAMIN) 100 MCG tablet      Past Surgical History:    Past Surgical History:   Procedure Laterality Date    BACK SURGERY N/A 2012    Decompression C3-C5    JOINT REPLACEMENT, HIP RT/LT  2013    Joint Replacement Hip LT        Physical Exam   Patient Vitals for the past 24 hrs:   BP Temp Pulse Resp SpO2   03/03/24 2320 119/78 -- 58 -- 97 %   03/03/24 2300 121/78 -- 61 -- --   03/03/24 2230 122/65 -- 57 -- 98 %   03/03/24 2220 126/69 -- 63 -- 97 %   03/03/24 2210 -- -- 66 -- --   03/03/24 2209 123/76 97.1  F (36.2  C) -- 16 99 %        Physical Exam  General: Patient is alert, awake and interactive when I enter the room  Eyes: The pupils are equal, round, and reactive to light. Conjunctivae and sclerae are normal  ENT: No hemotympanum or signs basilar skull fracture. The oropharynx is normal without erythema. No tenderness to palpation of the face, nose or jaw.   Neck: Normal range of motion. No cervical midline tenderness.   CV:  "Regular rate.  Resp: No distress. No crepitance.   GI: Abdomen is soft, no rigidity, guarding, or rebound. No contusion. No distension. No tenderness to palpation in any quadrant.     MS: Normal tone. Joints grossly normal without effusions. No asymmetric leg swelling, calf or thigh tenderness. Normal motor assessment of all extremities. PROM performed of all major joints without pain . No C/T/L tenderness in midline  Skin: facial abrasion over forehead, chin. Laceration to the bridge of the nose which is hemostatic.   Neuro:  GCS 15.  CN\"s II-XII intact. Speech is normal and fluent. Face is symmetric. Strength is normal and symmetric.   Psych: Normal affect.  Appropriate interactions.     Emergency Department Course     Imaging:  CT Facial Bones without Contrast   Final Result   IMPRESSION:    1.  No facial bone fracture or malalignment.            Procedures     Laceration Repair      Procedure: Laceration Repair    Indication: Laceration    Consent: Verbal    Location: bridge of the nose    Length: 1.5 cm    Preparation: Irrigation with Sterile Saline.    Anesthesia/Sedation: Lidocaine with Epinephrine - 1%      Treatment/Exploration: Wound explored, no foreign bodies found     Closure: The wound was closed with one layer. Skin/superficial layer was closed with 3 x 4-0 Polyglactin rapide (vicryl) absorbable  using Interrupted sutures.     Patient Status: The patient tolerated the procedure well: Yes. There were no complications.     Emergency Department Course & Assessments:    Interventions:  Medications   lidocaine 1% with EPINEPHrine 1:100,000 1 %-1:[id number] injection (  $Given by Other 3/4/24 0023)      Disposition:  The patient was discharged.     Impression & Plan      Medical Decision Making:  Patient is a 73-year-old gentleman who presents to the emergency department with facial trauma and nose laceration.  CT of the facial bones were negative for any evidence of fracture.  The remainder of the patient's " head to toe physical exam was reassuring.  Laceration was anesthetized, cleaned and closed as above.  Recommended close follow-up with primary care doctor.  Sutures were absorbable so he would not have to follow-up for removal.  Discussed wound cares for abrasions on face and chin.    Diagnosis:    ICD-10-CM    1. Laceration of nose, initial encounter  S01.21XA       2. Facial abrasion, initial encounter  S00.81XA            Discharge Medications:  Discharge Medication List as of 3/4/2024 12:41 AM             MD Danial Robles Christopher Joseph, MD  03/04/24 6850

## 2024-03-04 NOTE — ED TRIAGE NOTES
Went out to get garbage can, wind bled can back into face. Various abrasions to face. Area of bleeding externally on bridge of nose. Unable to control bleeding with pressure. Blood is pulsing out of wound. No thinners.

## 2024-04-02 NOTE — CONSULTS
DATE OF SERVICE : 9/28/2023    DATE OF ADMISSION: 9/27/2023    NEUROLOGICAL CONSULTATION    REQUESTED BY Dr. Marija mitchell. providers found    SOURCE OF INFORMATION:Patient and  EHR    REASON FOR CONSULTATION:     RUE weakness    HISTORY OF PRESENT ILLNESS:     He is a 73 years old man with a history of hypertension presenting with right upper extremity weakness.  He took a nap at noon upon waking up felt right upper extremity weak.  Reportedly had chronic numbness due to his cervical spinal issues.  Symptoms gradually worsened prompted for further eval.  Blood pressures were mildly elevated upon arrival initially had a stroke work-up.  CT angio neck 50% of right cervical ICA stenosis.  CT head multiple hypodensities compatible with age-indeterminate lacunar infarct bilateral corner radiator extending into central semimobile on the left putamen right cerebellum.  MR brain no acute pathology, generalized brain atrophy pressure microvascular ischemic changes.  Cervical spine s/p C3-C5 laminectomies with myelomalacia at C3-C4, few  levels of cord flattening without high-grade canal stenosis.    Labs were reviewed low sodium 132 GFR normal.  White count normal.  UA negative.  B12 normal. Hba1c 4.6, LDL 65 blood alcohol+  TSH folate normal   PSHx:   Past Surgical History:   Procedure Laterality Date    BACK SURGERY N/A 2012    Decompression C3-C5    JOINT REPLACEMENT, HIP RT/LT  2013    Joint Replacement Hip LT     Medications Prior to Admission   Medication Sig Dispense Refill Last Dose    amLODIPine (NORVASC) 10 MG tablet Take 10 mg by mouth daily   9/27/2023 at am    Flaxseed, Linseed, (FLAX SEED OIL) 1000 MG capsule Take 1 capsule by mouth daily   9/27/2023 at am    ibuprofen (ADVIL/MOTRIN) 200 MG tablet Take 400-600 mg by mouth 2 times daily as needed for pain   9/27/2023    lisinopril (ZESTRIL) 10 MG tablet Take 10 mg by mouth daily   9/27/2023 at am    multivitamin, therapeutic (THERA-VIT) TABS tablet Take 1 tablet  Addended by: DANIEL CHAPMAN on: 4/2/2024 10:21 AM     Modules accepted: Orders     by mouth daily   2023 at am    sertraline (ZOLOFT) 100 MG tablet Take 150 mg by mouth daily   2023 at am    vitamin B-12 (CYANOCOBALAMIN) 100 MCG tablet Take 100 mcg by mouth daily   2023 at am    albuterol (PROAIR HFA/PROVENTIL HFA/VENTOLIN HFA) 108 (90 Base) MCG/ACT inhaler Inhale 2 puffs into the lungs 4 times daily as needed for shortness of breath   prn     Current Facility-Administered Medications   Medication Dose Route Frequency    aspirin  81 mg Oral Daily    senna-docusate  1 tablet Oral BID    Or    senna-docusate  2 tablet Oral BID    sertraline  150 mg Oral Daily    sodium chloride (PF)  3 mL Intracatheter Q8H     Current Facility-Administered Medications   Medication Dose Route Frequency    acetaminophen  650 mg Oral Q4H PRN    Or    acetaminophen  650 mg Per NG tube Q4H PRN    Or    acetaminophen  650 mg Rectal Q4H PRN    ipratropium - albuterol 0.5 mg/2.5 mg/3 mL  3 mL Nebulization Q4H PRN    lidocaine 4%   Topical Q1H PRN    lidocaine (buffered or not buffered)  0.1-1 mL Other Q1H PRN    - MEDICATION INSTRUCTIONS -   Does not apply Continuous PRN    ondansetron  4 mg Oral Q6H PRN    Or    ondansetron  4 mg Intravenous Q6H PRN    prochlorperazine  5 mg Intravenous Q6H PRN    Or    prochlorperazine  5 mg Oral Q6H PRN    Or    prochlorperazine  12.5 mg Rectal Q12H PRN    sodium chloride (PF)  3 mL Intracatheter q1 min prn            Allergies   Allergen Reactions    Iodine Hives     Per patient at 2015 OV, he can ingest seafood if he takes an antihistamine before meal    Varenicline Anxiety    Iodinated Contrast Media Hives         SocHx:  reports that he has been smoking cigarettes. He has a 78.00 pack-year smoking history. He has never used smokeless tobacco. He reports current alcohol use. He reports that he does not use drugs.  Family History   Problem Relation Age of Onset    Other Cancer Mother 86         at 87 - believed to be ovarian          PHYSICAL EXAM  BP (!)  153/75 (BP Location: Left arm)   Pulse 72   Temp 98.7  F (37.1  C) (Oral)   Resp 16   Wt 72.9 kg (160 lb 11.5 oz)   SpO2 96%   BMI 24.80 kg/m          No acute distress no labored breathing normal mood no clubbing cyanosis or pedal edema  Alert and oriented to current situation fund of knowledge is intact spontaneous speech compression is normal no dysarthria  Pupils equal and functional.  Extraocular movements are intact face is symmetric hearing normal to conversation tongue is in midline    Right upper extremity strength is intact proximally distally weakness in wrist extension 0 and finger extension 0, weak handgrip 3-4(some of which is limited by finger extension weakness)  Left upper extremity strength is intact bilateral lower extremity strength is intact reflexes 2+ bilateral upper limbs 2 at the knees and diminished at the ankles  Sensations diminished to light touch and temperature over the dorsum of the right hand  Casual gait  Lab and X-ray:   Recent Labs   Lab Test 09/27/23  1758   WBC 5.8   HGB 13.0*      INR 0.87   POTASSIUM 4.4   LDL 65     Recent Labs   Lab Test 09/27/23  1758   POTASSIUM 4.4   CHLORIDE 94*   BUN 5.6*     Recent Labs   Lab Test 09/27/23  1758   WBC 5.8   HGB 13.0*   *      INR 0.87     No lab results found.    Invalid input(s): TBILI  Recent Labs   Lab Test 09/27/23  1758      LDL 65     No lab results found.  MR brain images reviewed with the patient and wife at the bedside  Laboratory results were personally interpreted and reviewed in detail.  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